# Patient Record
Sex: FEMALE | Race: WHITE | NOT HISPANIC OR LATINO | Employment: STUDENT | RURAL
[De-identification: names, ages, dates, MRNs, and addresses within clinical notes are randomized per-mention and may not be internally consistent; named-entity substitution may affect disease eponyms.]

---
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
## 2022-03-28 ENCOUNTER — TELEPHONE (OUTPATIENT)
Dept: OBSTETRICS AND GYNECOLOGY | Facility: CLINIC | Age: 22
End: 2022-03-28

## 2022-03-28 NOTE — TELEPHONE ENCOUNTER
PT is transferring from  and is currently 20 weeks. She has her anatomy scan with them on Wed 03/30. She stated she's been having a few things going on (Did not go into detail) but no one is giving her answers and she's just having a bad experience at . We scheduled her for new ob w/ Antonio on 04/27 but she wanted to know if she could come in sooner.

## 2022-03-28 NOTE — TELEPHONE ENCOUNTER
Pt. Plans on having her anatomy scan with  since it is already scheduled on Wednesday. She wanted to see VINAY earlier than 4/27 to discuss the pelvic pain/pressure she is having. Opening 4/13, rescheduled

## 2022-04-13 ENCOUNTER — TELEPHONE (OUTPATIENT)
Dept: OBSTETRICS AND GYNECOLOGY | Facility: CLINIC | Age: 22
End: 2022-04-13

## 2022-04-13 ENCOUNTER — INITIAL PRENATAL (OUTPATIENT)
Dept: OBSTETRICS AND GYNECOLOGY | Facility: CLINIC | Age: 22
End: 2022-04-13

## 2022-04-13 DIAGNOSIS — Z34.02 PRENATAL CARE, FIRST PREGNANCY IN SECOND TRIMESTER: Primary | ICD-10-CM

## 2022-04-13 DIAGNOSIS — F32.89 OTHER DEPRESSION: ICD-10-CM

## 2022-04-13 PROCEDURE — 99203 OFFICE O/P NEW LOW 30 MIN: CPT | Performed by: OBSTETRICS & GYNECOLOGY

## 2022-04-13 PROCEDURE — 99406 BEHAV CHNG SMOKING 3-10 MIN: CPT | Performed by: OBSTETRICS & GYNECOLOGY

## 2022-04-13 RX ORDER — SERTRALINE HYDROCHLORIDE 25 MG/1
50 TABLET, FILM COATED ORAL DAILY
COMMUNITY
Start: 2022-03-24 | End: 2022-04-13 | Stop reason: SDUPTHER

## 2022-04-13 RX ORDER — PRENATAL WITH FERROUS FUM AND FOLIC ACID 3080; 920; 120; 400; 22; 1.84; 3; 20; 10; 1; 12; 200; 27; 25; 2 [IU]/1; [IU]/1; MG/1; [IU]/1; MG/1; MG/1; MG/1; MG/1; MG/1; MG/1; UG/1; MG/1; MG/1; MG/1; MG/1
1 TABLET ORAL DAILY
COMMUNITY

## 2022-04-13 RX ORDER — ALBUTEROL SULFATE 90 UG/1
2 AEROSOL, METERED RESPIRATORY (INHALATION) EVERY 6 HOURS PRN
COMMUNITY
Start: 2022-01-19 | End: 2022-06-20 | Stop reason: SDUPTHER

## 2022-04-13 RX ORDER — SERTRALINE HYDROCHLORIDE 25 MG/1
50 TABLET, FILM COATED ORAL DAILY
Qty: 90 TABLET | Refills: 1 | Status: SHIPPED | OUTPATIENT
Start: 2022-04-13 | End: 2022-07-27

## 2022-04-13 NOTE — TELEPHONE ENCOUNTER
Reviewed patient's chart and confirmed [facility] records available through [facility] (labs and OB notes). Informed patient and provided patient with instruction on how to get to office. Verbalized understanding.

## 2022-04-13 NOTE — TELEPHONE ENCOUNTER
Pt sent transfer records through my chart to Dr. Alvarez and she wants to confirm Antonio got them.

## 2022-04-14 ENCOUNTER — TELEPHONE (OUTPATIENT)
Dept: OBSTETRICS AND GYNECOLOGY | Facility: CLINIC | Age: 22
End: 2022-04-14

## 2022-04-14 ENCOUNTER — ROUTINE PRENATAL (OUTPATIENT)
Dept: OBSTETRICS AND GYNECOLOGY | Facility: CLINIC | Age: 22
End: 2022-04-14

## 2022-04-14 VITALS — WEIGHT: 212.4 LBS | DIASTOLIC BLOOD PRESSURE: 74 MMHG | BODY MASS INDEX: 33.27 KG/M2 | SYSTOLIC BLOOD PRESSURE: 110 MMHG

## 2022-04-14 DIAGNOSIS — Z3A.23 23 WEEKS GESTATION OF PREGNANCY: Primary | ICD-10-CM

## 2022-04-14 DIAGNOSIS — O20.9 VAGINAL BLEEDING AFFECTING EARLY PREGNANCY: ICD-10-CM

## 2022-04-14 DIAGNOSIS — O26.859 SPOTTING IN PREGNANCY: Primary | ICD-10-CM

## 2022-04-14 LAB
BILIRUB BLD-MCNC: NEGATIVE MG/DL
CLARITY, POC: ABNORMAL
COLOR UR: YELLOW
GLUCOSE UR STRIP-MCNC: NEGATIVE MG/DL
KETONES UR QL: ABNORMAL
LEUKOCYTE EST, POC: ABNORMAL
NITRITE UR-MCNC: NEGATIVE MG/ML
PH UR: 1 [PH] (ref 5–8)
PROT UR STRIP-MCNC: NEGATIVE MG/DL
RBC # UR STRIP: ABNORMAL /UL
SP GR UR: 1.02 (ref 1–1.03)
UROBILINOGEN UR QL: NORMAL

## 2022-04-14 PROCEDURE — 99214 OFFICE O/P EST MOD 30 MIN: CPT | Performed by: NURSE PRACTITIONER

## 2022-04-14 RX ORDER — CEPHALEXIN 500 MG/1
500 CAPSULE ORAL 2 TIMES DAILY
Qty: 14 CAPSULE | Refills: 0 | Status: SHIPPED | OUTPATIENT
Start: 2022-04-14 | End: 2022-04-21

## 2022-04-14 NOTE — PROGRESS NOTES
OB FOLLOW UP  CC- Here for care of pregnancy        Jenny Brunson is a 21 y.o.  23w0d patient being seen today for her obstetrical follow up visit. Patient reports occasional cramping, headaches, and vaginal spotting. Patient stated that she started spotting this morning around 9 am. Patient stated that the spotting is bright red with blood clots. Patient stated that the blood clots are tiny and the spotting is light.     Her prenatal care is complicated by (and status) :    Patient Active Problem List   Diagnosis   • Prenatal care, first pregnancy in second trimester       Flu Status: Already given in current flu season  Ultrasound Today: Yes.  Findings showed anterior placenta, CL-47 mm.  I have personally evaluated the U/S and agree with the findings. FRANCISCO JAVIER Keyes    ROS -   Patient Reports : Vaginal Spotting, Headaches and Cramping  Patient Denies: Loss of Fluid, Vision Changes, Headaches, Nausea , Vomiting  and Contractions  Fetal Movement : normal  All other systems reviewed and are negative.       The additional following portions of the patient's history were reviewed and updated as appropriate: allergies and current medications.    I have reviewed and agree with the HPI, ROS, and historical information as entered above. Rosi Whaley, FRANCISCO JAVIER    /74   Wt 96.3 kg (212 lb 6.4 oz)   BMI 33.27 kg/m²       EXAM:     FHT: pos BPM     Pelvic Exam: Yes Dilation: Closed  Effacement: Long    Urine glucose/protein: See prenatal flowsheet       Assessment and Plan    Problem List Items Addressed This Visit    None     Visit Diagnoses     23 weeks gestation of pregnancy    -  Primary    Relevant Orders    POC Urinalysis Dipstick    Urine Culture - Urine, Urine, Clean Catch    Vaginal bleeding affecting early pregnancy        Relevant Orders    Urine Culture - Urine, Urine, Clean Catch          1. Pregnancy at 23w0d  2. Fetal status reassuring.   3. Activity and Exercise discussed.  Return  for Next scheduled follow up.   CCUA- moderate leuks and blood, culture sent. Start keflex.   U/S normal today, VE closed/thick. No blood noted on exam  To L&D for further BRB or worsening cramping.   Advised to hydrate, pelvic rest, no heavy lifting.     Rosi Whaley, APRN  04/14/2022

## 2022-04-14 NOTE — TELEPHONE ENCOUNTER
Pt stated that she's having some some bright red spotting. She's been sore a few days and has some cramping. She stated the cramping is not severe but it feels like a normal cramp  
Spoke with pt and she states that she noticed she had some bright red spotting this morning and has been having some mild cramping. She reports she has an anterior placenta and is unable to feel baby move.     I spoke with TH and she states for pt to come in and have US for cervical length.   
Covid test

## 2022-04-18 ENCOUNTER — TELEPHONE (OUTPATIENT)
Dept: OBSTETRICS AND GYNECOLOGY | Facility: CLINIC | Age: 22
End: 2022-04-18

## 2022-04-18 LAB
BACTERIA UR CULT: ABNORMAL
BACTERIA UR CULT: ABNORMAL

## 2022-04-18 NOTE — TELEPHONE ENCOUNTER
S/w pt she states she thinks she lost her mucus plug yesterday morning around 10:30 AM. States it looked like thick snot and was yellow/green in color. I advised patient that if she thinks she lost her mucus plug then she will need to go into L&D since it is already past appts hours here in the office. Patient states she is unable to get a ride today and would like to come in tomorrow for an appt but needs to speak with  first to see if he can bring her. I advised patient to CB in the morning at 8:30AM to get an appt with our office. She v/u.

## 2022-04-18 NOTE — TELEPHONE ENCOUNTER
Dr. Alvarez OB pt.   23w4d    S/w pt she states she thinks she lost her mucus plug this morning and was concerned. States baby movement is present and denies contractions, vaginal bleeding, headaches, vision changes.    Pt. States she has a picture of the mucus plug she think she lost and is sending it through [employer] now w/ ATTN: Carine .

## 2022-04-20 ENCOUNTER — REFERRAL TRIAGE (OUTPATIENT)
Dept: LABOR AND DELIVERY | Facility: HOSPITAL | Age: 22
End: 2022-04-20

## 2022-04-21 ENCOUNTER — PATIENT OUTREACH (OUTPATIENT)
Dept: LABOR AND DELIVERY | Facility: HOSPITAL | Age: 22
End: 2022-04-21

## 2022-04-21 NOTE — OUTREACH NOTE
[facility]  Enrollment    Questions/Answers    Flowsheet Row Responses   Would like to participate? Yes   Date of Intake Visit 04/25/22              Becki Keys RN  Maternity Nurse Navigator    4/21/2022, 16:49 EDT

## 2022-04-25 ENCOUNTER — PATIENT OUTREACH (OUTPATIENT)
Dept: LABOR AND DELIVERY | Facility: HOSPITAL | Age: 22
End: 2022-04-25

## 2022-04-29 ENCOUNTER — TELEPHONE (OUTPATIENT)
Dept: OBSTETRICS AND GYNECOLOGY | Facility: CLINIC | Age: 22
End: 2022-04-29

## 2022-05-05 VITALS — WEIGHT: 215 LBS | DIASTOLIC BLOOD PRESSURE: 70 MMHG | BODY MASS INDEX: 33.67 KG/M2 | SYSTOLIC BLOOD PRESSURE: 100 MMHG

## 2022-05-16 ENCOUNTER — TELEPHONE (OUTPATIENT)
Dept: OBSTETRICS AND GYNECOLOGY | Facility: CLINIC | Age: 22
End: 2022-05-16

## 2022-05-16 NOTE — TELEPHONE ENCOUNTER
Dr. Alvarez OB pt.   27w4d      S/w pt she states FOB recently was taken to senior living and patient is in the process of moving out of the house and trying to find somewhere to go that she can afford. States her current landlord is helping her  find somewhere to go for cheaper rent. Patient states she is needing a note to be excused from work due to trying to figure out living situation as of now and get all of this taken care of. Patients current job denied FMLA/leave because she has not been there for one year yet. I told patient I would discuss this with Dr. Alvarez and let her know. She v/u

## 2022-05-16 NOTE — TELEPHONE ENCOUNTER
Pt called to speak to someone regarding needing a note for time off of work. Pt has had some personal events occur which has led her to need time off to move living situations with no help. Please call pt back and advise.

## 2022-05-16 NOTE — TELEPHONE ENCOUNTER
While I sympathize, I'm not sure a note from me is going to do anything for her situation.  This is really between her and her employer.

## 2022-06-06 ENCOUNTER — ROUTINE PRENATAL (OUTPATIENT)
Dept: OBSTETRICS AND GYNECOLOGY | Facility: CLINIC | Age: 22
End: 2022-06-06

## 2022-06-06 VITALS — SYSTOLIC BLOOD PRESSURE: 102 MMHG | WEIGHT: 216.2 LBS | BODY MASS INDEX: 33.86 KG/M2 | DIASTOLIC BLOOD PRESSURE: 60 MMHG

## 2022-06-06 DIAGNOSIS — Z3A.30 30 WEEKS GESTATION OF PREGNANCY: Primary | ICD-10-CM

## 2022-06-06 LAB
GLUCOSE UR STRIP-MCNC: NEGATIVE MG/DL
PROT UR STRIP-MCNC: NEGATIVE MG/DL

## 2022-06-06 PROCEDURE — 99213 OFFICE O/P EST LOW 20 MIN: CPT | Performed by: OBSTETRICS & GYNECOLOGY

## 2022-06-06 NOTE — PROGRESS NOTES
OB FOLLOW UP    Jenny Brunson is a 21 y.o.  30w4d patient being seen today for her obstetrical follow up visit. Patient reports no complaints.     Her prenatal care is complicated by (and status) :    Patient Active Problem List   Diagnosis   • Prenatal care, first pregnancy in second trimester   • 30 weeks gestation of pregnancy   • Decreased fetal movement   • 32 weeks gestation of pregnancy       ROS -   Patient Reports : No Problems  Patient Denies: No Problems  Fetal Movement : normal      /60   Wt 98.1 kg (216 lb 3.2 oz)   BMI 33.86 kg/m²     Ultrasound Today: no    EXAM:  Vitals: See prenatal flowsheet   Abdomen: See prenatal flowsheet   Urine glucose/protein: See prenatal flowsheet   Pelvic: See prenatal flowsheet     Assessment    Diagnoses and all orders for this visit:    1. 30 weeks gestation of pregnancy (Primary)  -     CBC (No Diff)  -     Antibody Screen  -     Gestational Screen 1 Hr ([facility])  -     POC Urinalysis Dipstick        1. Pregnancy at 30w4d  2. Fetal status reassuring     Problem List Items Addressed This Visit     30 weeks gestation of pregnancy - Primary    Relevant Orders    CBC (No Diff) (Completed)    Antibody Screen (Completed)    Gestational Screen 1 Hr ([facility]) (Completed)    POC Urinalysis Dipstick (Completed)          Plan    1. Fetal movement/ Labor Precautions  2. Comfort measures.  Diet/exercise precautions  Follow Up: Return in about 2 weeks (around 2022) for Next scheduled follow up.        Itzel Alvarez MD  2022

## 2022-06-07 LAB
BLD GP AB SCN SERPL QL: NEGATIVE
ERYTHROCYTE [DISTWIDTH] IN BLOOD BY AUTOMATED COUNT: 13.2 % (ref 12.3–15.4)
GLUCOSE 1H P 50 G GLC PO SERPL-MCNC: 91 MG/DL (ref 65–139)
HCT VFR BLD AUTO: 31.9 % (ref 34–46.6)
HGB BLD-MCNC: 10.8 G/DL (ref 12–15.9)
MCH RBC QN AUTO: 27.2 PG (ref 26.6–33)
MCHC RBC AUTO-ENTMCNC: 33.9 G/DL (ref 31.5–35.7)
MCV RBC AUTO: 80.4 FL (ref 79–97)
PLATELET # BLD AUTO: 195 10*3/MM3 (ref 140–450)
RBC # BLD AUTO: 3.97 10*6/MM3 (ref 3.77–5.28)
WBC # BLD AUTO: 8.64 10*3/MM3 (ref 3.4–10.8)

## 2022-06-14 ENCOUNTER — HOSPITAL ENCOUNTER (OUTPATIENT)
Facility: HOSPITAL | Age: 22
Setting detail: OBSERVATION
Discharge: HOME | End: 2022-06-14
Attending: OBSTETRICS & GYNECOLOGY | Admitting: OBSTETRICS & GYNECOLOGY

## 2022-06-14 ENCOUNTER — TELEPHONE (OUTPATIENT)
Dept: OBSTETRICS AND GYNECOLOGY | Facility: CLINIC | Age: 22
End: 2022-06-14

## 2022-06-14 VITALS
RESPIRATION RATE: 18 BRPM | TEMPERATURE: 99.1 F | HEART RATE: 100 BPM | HEIGHT: 69 IN | DIASTOLIC BLOOD PRESSURE: 62 MMHG | WEIGHT: 215 LBS | SYSTOLIC BLOOD PRESSURE: 123 MMHG | BODY MASS INDEX: 31.84 KG/M2

## 2022-06-14 PROBLEM — O36.8190 DECREASED FETAL MOVEMENT: Status: ACTIVE | Noted: 2022-06-14

## 2022-06-14 PROCEDURE — 59025 FETAL NON-STRESS TEST: CPT

## 2022-06-14 PROCEDURE — 99218 PR INITIAL OBSERVATION CARE/DAY 30 MINUTES: CPT | Performed by: OBSTETRICS & GYNECOLOGY

## 2022-06-14 PROCEDURE — G0378 HOSPITAL OBSERVATION PER HR: HCPCS

## 2022-06-14 PROCEDURE — 59025 FETAL NON-STRESS TEST: CPT | Performed by: OBSTETRICS & GYNECOLOGY

## 2022-06-14 NOTE — TELEPHONE ENCOUNTER
Dr. Alvarez OB pt.   31w5d    S/w pt she states for the past 2 days she has had increased fetal movement and baby has been moving extremely fast and she also has constipation and her stomach feels very tight (at times)     Patient denies vaginal bleeding, LOF, HA's or vision changes, severe pelvic pain.    Per Libby, patient needs to go downstairs to L&D for further evaluation.     S/w pt she v/u

## 2022-06-14 NOTE — H&P
[facility]  Obstetric History and Physical    Referring Provider: Itzel Alvarez*      Chief Complaint   Patient presents with   • Decreased Fetal Movement     Noted today w/increased fatigue       Subjective     Patient is a 21 y.o. female  currently at 31w5d, who presents with complaint decreased fetal movement.  Patient reports decreased fetal movement today without associated leaking of fluid, vaginal bleeding, recent trauma, short of breath, chest pain any other concerns.  After arrival to labor and delivery patient reports more normal fetal activity.  Prenatal care has been uneventful to date..    .    The following portions of the patients history were reviewed and updated as appropriate: current medications, allergies, past medical history, past surgical history, past family history, past social history and problem list .       Prenatal Information:   Maternal Prenatal Labs  Blood Type No results found for: ABO   Rh Status No results found for: RH   Antibody Screen No results found for: ABSCRN   Gonnorhea No results found for: GCCX   Chlamydia No results found for: CLAMYDCU   RPR No results found for: RPR   Syphilis Antibody No results found for: SYPHILIS   Rubella No results found for: RUBELLAIGGIN   Hepatitis B Surface Antigen No results found for: HEPBSAG   HIV-1 Antibody No results found for: LABHIV1   Hepatitis C Antibody No results found for: HEPCAB   Rapid Urin Drug Screen No results found for: AMPMETHU, BARBITSCNUR, LABBENZSCN, LABMETHSCN, LABOPIASCN, THCURSCR, COCAINEUR, AMPHETSCREEN, PROPOXSCN, BUPRENORSCNU, METAMPSCNUR, OXYCODONESCN, TRICYCLICSCN   Group B Strep Culture No results found for: GBSANTIGEN           External Prenatal Results     Pregnancy Outside Results - Transcribed From Office Records - See Scanned Records For Details     Test Value Date Time    ABO       Rh       Antibody Screen  Negative  22 1007    Varicella IgG       Rubella       Hgb  10.8 g/dL 22  1007      ^ 13.3 g/dL 224      ^ 13.5 g/dL 01/10/22 1619    Hct  31.9 % 22 1007      ^ 39.7 % 22      ^ 40.0 % 01/10/22 1619    Glucose Fasting GTT       Glucose Tolerance Test 1 hour       Glucose Tolerance Test 3 hour       Gonorrhea (discrete)       Chlamydia (discrete)       RPR       VDRL       Syphilis Antibody       HBsAg ^ Negative  01/10/22 161    Herpes Simplex Virus PCR       Herpes Simplex VIrus Culture       HIV ^ Nonreactive  01/10/22 1619    Hep C RNA Quant PCR       Hep C Antibody       AFP       Group B Strep       GBS Susceptibility to Clindamycin       GBS Susceptibility to Erythromycin       Fetal Fibronectin       Genetic Testing, Maternal Blood             Drug Screening     Test Value Date Time    Urine Drug Screen       Amphetamine Screen  Negative  16    Barbiturate Screen       Benzodiazepine Screen  Negative  16    Methadone Screen       Phencyclidine Screen       Opiates Screen       THC Screen  PRESUMPTIVE POSITIVE  16    Cocaine Screen       Propoxyphene Screen       Buprenorphine Screen ^ <10 ng/mL 01/10/22 1500      ^ <50 ng/mL 01/10/22 1500    Methamphetamine Screen       Oxycodone Screen       Tricyclic Antidepressants Screen  Negative  16          Legend    ^: Historical                          Past OB History:       OB History    Para Term  AB Living   1 0 0 0 0 0   SAB IAB Ectopic Molar Multiple Live Births   0 0 0 0 0 0      # Outcome Date GA Lbr Jay/2nd Weight Sex Delivery Anes PTL Lv   1 Current                Past Medical History: Past Medical History:   Diagnosis Date   • Anxiety    • Asthma    • Depression    • Tonsil, abscess       Past Surgical History Past Surgical History:   Procedure Laterality Date   • TONSILLECTOMY  2017    due to abscess      Family History: Family History   Problem Relation Age of Onset   • Skin cancer Mother    • Autism Brother    • Bipolar disorder Brother    •  Psychosis Brother       Social History:  reports that she has never smoked. She uses smokeless tobacco.   reports previous alcohol use.   reports no history of drug use.                   General ROS Negative Findings:Headaches, Visual Changes, Epigastric pain, Anorexia, Nausia/Vomiting, ROM and Vaginal Bleeding    ROS     All other systems have been reviewed and are neg  Objective       Vital Signs Range for the last 24 hours  Temperature: Temp:  [99.1 °F (37.3 °C)] 99.1 °F (37.3 °C)   Temp Source: Temp src: Oral   BP: BP: (123)/(62) 123/62   Pulse: Heart Rate:  [100] 100   Respirations: Resp:  [18] 18   SPO2:     O2 Amount (l/min):     O2 Devices     Weight: Weight:  [97.5 kg (215 lb)] 97.5 kg (215 lb)     Physical Examination:   General:   alert, appears stated age and cooperative   Skin:   normal   HEENT:  Sclera clear   Lungs:      Heart:      Gastrointestinal:  Abdomen soft, gravid uterus nontender, no guarding benign exam   Lower Extremities:  Trace edema, no calf tenderness   : External genitalia: normal general appearance  Uterus: enlarged   Musculoskeletal:     Neuro:  Focal deficits noted DTR 2+4 no clonus         Presentation: vtx   Cervix: Exam by: Method: sterile exam per physician (Feliz)   Dilation:  Closed   Effacement:  30%   Station:  -3 not engaged  No blood noted on glove.       Fetal Heart Rate Assessment   Method:     Beats/min:     Baseline:     Varibility:     Accels:     Decels:     Tracing Category:     NST-indications decreased fetal movement, interpretation reactive, moderate variability, accelerations present 15 x 15, no fetal celebrations noted, onset 1712, end time 1808, no regular contractions noted.  Uterine Assessment   Method:     Frequency (min):     Ctx Count in 10 min:     Duration:     Intensity:     Intensity by IUPC:     Resting Tone:     Resting Tone by IUPC:     Jal Units:       Laboratory Results:   Lab Results (last 24 hours)     ** No results found for the  last 24 hours. **        Radiology Review:   Imaging Results (Last 24 Hours)     ** No results found for the last 24 hours. **        Other Studies: Bedside portable ultrasound: Single IUP vertex presentation, fetus active, biophysical profile 8 out of 8, anterior placenta.    Assessment & Plan       Decreased fetal movement        Assessment:  1.  Intrauterine pregnancy at 31w5d weeks gestation with reactive fetal status.    2.  History decreased fetal movement, currently reports normal fetal activity-fetal wellbeing established.  3.  False labor  4.      Plan:  1.  Discharge home, kick count,  instructions given, follow-up with OB provider routine or as needed  2. Plan of care has been reviewed with patient.  3.  Risks, benefits of treatment plan have been discussed.  4.  All questions have been answered.  5      Osvaldo Piper,   2022  18:23 EDT

## 2022-06-20 ENCOUNTER — ROUTINE PRENATAL (OUTPATIENT)
Dept: OBSTETRICS AND GYNECOLOGY | Facility: CLINIC | Age: 22
End: 2022-06-20

## 2022-06-20 VITALS — SYSTOLIC BLOOD PRESSURE: 116 MMHG | DIASTOLIC BLOOD PRESSURE: 70 MMHG | WEIGHT: 218.2 LBS | BODY MASS INDEX: 32.22 KG/M2

## 2022-06-20 DIAGNOSIS — Z3A.32 32 WEEKS GESTATION OF PREGNANCY: Primary | ICD-10-CM

## 2022-06-20 LAB
EXPIRATION DATE: 0
GLUCOSE UR STRIP-MCNC: NEGATIVE MG/DL
Lab: 0
PROT UR STRIP-MCNC: NEGATIVE MG/DL

## 2022-06-20 PROCEDURE — 99213 OFFICE O/P EST LOW 20 MIN: CPT | Performed by: OBSTETRICS & GYNECOLOGY

## 2022-06-20 RX ORDER — SERTRALINE HYDROCHLORIDE 25 MG/1
25 TABLET, FILM COATED ORAL DAILY
COMMUNITY
Start: 2022-03-24 | End: 2022-06-20 | Stop reason: SDUPTHER

## 2022-06-26 PROBLEM — F32.A DEPRESSION: Status: ACTIVE | Noted: 2022-06-26

## 2022-06-28 ENCOUNTER — TELEPHONE (OUTPATIENT)
Dept: OBSTETRICS AND GYNECOLOGY | Facility: CLINIC | Age: 22
End: 2022-06-28

## 2022-06-28 NOTE — TELEPHONE ENCOUNTER
Call made to patient. Pt states possible irregular contractions, not lasting long, not intensifying with each contraction. Right sided achy/stabbing pain. Pressure with sitting and standing. Back pain, Exhausted. Has not tried tylenol bc she does not want to tk medication during pregnancy. Cannot find a belly band anywhere local.  Baby is moving good like nml, no vaginal bleeding, no ROM. Drinking plenty of water. Tried lying on L side. Not comfortable. Went to L&D a few weeks ago. She was told everything was good. Patient advised she can try to drink 1-2 quarts of water lie on left side, get a belly band from [employer], try tylenol bc baby is growing and as pregnancy progresses she will be uncomfortable. If she feels pain and pressure are severe she can come in today to be evaluated in L&D. Patient states she will check on [employer] for belly band. lie on left side, drink 1 quart of water, monitor for contractions, and baby's movement. Call back if symptoms worsen, contractions occur >4/hr lasting 45-60sec, movements decrease/increase, experiences ROM or vag bleeding call back immediately.

## 2022-06-28 NOTE — TELEPHONE ENCOUNTER
PT STATED FOR THE LAST SEVERAL DAYS SHE HAS BEEN EXPERIENCING EXTREME DISCOMFORT AND DOWNWARD PELVIC PRESSURE WHEN SITTING OR STANDING PT STATED SHE IS HIGHLY UNCOMFORTABLE AND HAVING LOTS OF BACK PAIN AND STOMACH CRAMPING. PT STATED SHE IS VERY FATIGUED STATED SHE RARELY EVER TAKES NAPS AND HAS ALREADY TAKEN 3 TODAY. PT STATED SYMPTOMS WORSEN WITH MOVEMENT OR ACTIVITY

## 2022-06-28 NOTE — TELEPHONE ENCOUNTER
Spoke with pt and she states that in the last couple of days she has begin having some back pain, extreme fatigue, period like cramping (not close together), she has pain when sitting/standing (feels like baby drops every time she stands) and she is having tightening in her abdomen intermittently. She states she is feeling baby move good and putting her legs up helps.

## 2022-06-30 ENCOUNTER — HOSPITAL ENCOUNTER (OUTPATIENT)
Facility: HOSPITAL | Age: 22
Discharge: HOME | End: 2022-06-30
Attending: OBSTETRICS & GYNECOLOGY | Admitting: OBSTETRICS & GYNECOLOGY

## 2022-06-30 VITALS
WEIGHT: 218 LBS | DIASTOLIC BLOOD PRESSURE: 77 MMHG | HEART RATE: 81 BPM | SYSTOLIC BLOOD PRESSURE: 119 MMHG | BODY MASS INDEX: 32.19 KG/M2 | RESPIRATION RATE: 16 BRPM | TEMPERATURE: 98.8 F

## 2022-06-30 PROCEDURE — G0463 HOSPITAL OUTPT CLINIC VISIT: HCPCS

## 2022-06-30 PROCEDURE — 59025 FETAL NON-STRESS TEST: CPT | Performed by: OBSTETRICS & GYNECOLOGY

## 2022-06-30 PROCEDURE — 99213 OFFICE O/P EST LOW 20 MIN: CPT | Performed by: OBSTETRICS & GYNECOLOGY

## 2022-06-30 PROCEDURE — 59025 FETAL NON-STRESS TEST: CPT

## 2022-07-06 ENCOUNTER — ROUTINE PRENATAL (OUTPATIENT)
Dept: OBSTETRICS AND GYNECOLOGY | Facility: CLINIC | Age: 22
End: 2022-07-06

## 2022-07-06 VITALS — SYSTOLIC BLOOD PRESSURE: 130 MMHG | DIASTOLIC BLOOD PRESSURE: 80 MMHG | BODY MASS INDEX: 32.78 KG/M2 | WEIGHT: 222 LBS

## 2022-07-06 DIAGNOSIS — Z3A.34 34 WEEKS GESTATION OF PREGNANCY: Primary | ICD-10-CM

## 2022-07-06 LAB
EXPIRATION DATE: NORMAL
GLUCOSE UR STRIP-MCNC: NEGATIVE MG/DL
Lab: NORMAL
PROT UR STRIP-MCNC: NEGATIVE MG/DL

## 2022-07-06 PROCEDURE — 99213 OFFICE O/P EST LOW 20 MIN: CPT | Performed by: NURSE PRACTITIONER

## 2022-07-06 RX ORDER — FERROUS SULFATE 325(65) MG
325 TABLET ORAL
Qty: 60 TABLET | Refills: 1 | Status: SHIPPED | OUTPATIENT
Start: 2022-07-06

## 2022-07-06 NOTE — PROGRESS NOTES
OB FOLLOW UP  CC- Here for care of pregnancy        Jenny Brunson is a 22 y.o.  34w6d patient being seen today for her obstetrical follow up visit. Patient reports positive home covid test on 2022.  She reports that she has still been feeling weak and sluggish.       She c/o swelling in her feet and ankles.  She also reports ade davey, she states that discomfort is occurring most of the day.      She denies LOF, vaginal spotting, headaches, vision changes. She reports adequate fetal movements, >10 movements in 10 hours.     Her prenatal care is complicated by (and status) :    Patient Active Problem List   Diagnosis   • Prenatal care, first pregnancy in second trimester   • 30 weeks gestation of pregnancy   • Decreased fetal movement   • 32 weeks gestation of pregnancy   • Depression     Ultrasound Today: No.    ROS -   Patient Reports : swelling and ade davey  Patient Denies: Loss of Fluid, Vaginal Spotting, Vision Changes, Headaches, Nausea , Vomiting  and Epigastric pain  Fetal Movement : adequate  All other systems reviewed and are negative.       The additional following portions of the patient's history were reviewed and updated as appropriate: allergies, current medications, past family history, past medical history, past social history, past surgical history and problem list.    I have reviewed and agree with the HPI, ROS, and historical information as entered above. Cherry Resendez, APRN    /80   Wt 101 kg (222 lb)   BMI 32.78 kg/m²       EXAM:     FHT: 137BPM   Uterine Size: size equals dates  Pelvic Exam: No    Urine glucose/protein: See prenatal flowsheet       Assessment and Plan    Problem List Items Addressed This Visit    None     Visit Diagnoses     34 weeks gestation of pregnancy    -  Primary    Relevant Medications    ferrous sulfate 325 (65 FE) MG tablet    Other Relevant Orders    POC Glucose, Urine, Qualitative, Dipstick (Completed)    POC Protein, Urine,  Qualitative, Dipstick (Completed)          1. Pregnancy at 34w6d  2. Fetal status reassuring.   3. Activity and Exercise discussed.  + COVID test on 7/1/22, will start Baby ASA qd, Zinc 50mg daily, Vitamin C 500mg daily, Vit D 1000 IU daily  Will start Fe bid as recommended several weeks ago   Monitor daily fetal movements  RTC in one week with NST and see .     Cherry Resendez, APRN  07/06/2022

## 2022-07-13 ENCOUNTER — ROUTINE PRENATAL (OUTPATIENT)
Dept: OBSTETRICS AND GYNECOLOGY | Facility: CLINIC | Age: 22
End: 2022-07-13

## 2022-07-13 VITALS — SYSTOLIC BLOOD PRESSURE: 124 MMHG | DIASTOLIC BLOOD PRESSURE: 80 MMHG | BODY MASS INDEX: 32.64 KG/M2 | WEIGHT: 221 LBS

## 2022-07-13 DIAGNOSIS — Z3A.35 35 WEEKS GESTATION OF PREGNANCY: ICD-10-CM

## 2022-07-13 DIAGNOSIS — Z34.90 PREGNANCY, UNSPECIFIED GESTATIONAL AGE: Primary | ICD-10-CM

## 2022-07-13 PROBLEM — Z3A.32 32 WEEKS GESTATION OF PREGNANCY: Status: RESOLVED | Noted: 2022-06-20 | Resolved: 2022-07-13

## 2022-07-13 PROBLEM — Z3A.30 30 WEEKS GESTATION OF PREGNANCY: Status: RESOLVED | Noted: 2022-06-06 | Resolved: 2022-07-13

## 2022-07-13 LAB
GLUCOSE UR STRIP-MCNC: NEGATIVE MG/DL
PROT UR STRIP-MCNC: NEGATIVE MG/DL

## 2022-07-13 PROCEDURE — 87081 CULTURE SCREEN ONLY: CPT | Performed by: OBSTETRICS & GYNECOLOGY

## 2022-07-13 PROCEDURE — 99213 OFFICE O/P EST LOW 20 MIN: CPT | Performed by: OBSTETRICS & GYNECOLOGY

## 2022-07-13 NOTE — PROGRESS NOTES
OB FOLLOW UP  CC- Here for care of pregnancy        Jenny Brunson is a 22 y.o.  35w6d patient being seen today for her obstetrical follow up visit. Patient reports cramps in thighs. Good fm, occ menstrual type cramping, no vb or lof, +pelvic pressure,     Her prenatal care is complicated by (and status) : None  Patient Active Problem List   Diagnosis   • Prenatal care, first pregnancy in second trimester   • Decreased fetal movement   • Depression   • 35 weeks gestation of pregnancy   • BMI 32.0-32.9,adult       GBS Status: Done Today. She is not allergic to PCN.    No Known Allergies       Her Delivery Plan is: Desires IOL about 40wks. Needs to be scheduled 8/9pm  US today: no    ROS -   Patient Reports : No Problems  Patient Denies: Loss of Fluid, Vaginal Spotting, Contractions and notes pelvic pressure  Fetal Movement : normal  All other systems reviewed and are negative.       The additional following portions of the patient's history were reviewed and updated as appropriate: allergies, current medications, past family history, past medical history, past social history, past surgical history and problem list.    I have reviewed and agree with the HPI, ROS, and historical information as entered above. Itzel Alvarez MD      EXAM:     Prenatal Vitals  BP: 124/80  Weight: 100 kg (221 lb)   Fetal Heart Rate: pos   Fundal Height (cm): 36 cm   Dilation/Effacement/Station  Dilation: Closed  Effacement (%): 50  Station: -2      Urine Glucose Read-only: Negative  Urine Protein Read-only: Negative       Assessment and Plan    Problem List Items Addressed This Visit     35 weeks gestation of pregnancy    BMI 32.0-32.9,adult      Other Visit Diagnoses     Pregnancy, unspecified gestational age    -  Primary    Relevant Orders    POC Urinalysis Dipstick (Completed)    Group B Streptococcus Culture - Swab, Vaginal/Rectum          1. Pregnancy at 35w6d  2. Fetal status reassuring.   3. Reviewed  Pre-eclampsia signs/symptoms  4. Discussed IOL options with patient. Pt. desires IOL at 39 weeks.   5. Delivery options reviewed with patient  6. Signs of labor reviewed  7. Kick counts reviewed  8. Activity and Exercise discussed.  Return in about 1 week (around 7/20/2022) for Next scheduled follow up.    Itzel Alvarez MD  07/13/2022

## 2022-07-17 LAB — BACTERIA SPEC AEROBE CULT: NORMAL

## 2022-07-20 ENCOUNTER — ROUTINE PRENATAL (OUTPATIENT)
Dept: OBSTETRICS AND GYNECOLOGY | Facility: CLINIC | Age: 22
End: 2022-07-20

## 2022-07-20 VITALS — SYSTOLIC BLOOD PRESSURE: 120 MMHG | WEIGHT: 223 LBS | DIASTOLIC BLOOD PRESSURE: 62 MMHG | BODY MASS INDEX: 32.93 KG/M2

## 2022-07-20 DIAGNOSIS — Z3A.37 37 WEEKS GESTATION OF PREGNANCY: Primary | ICD-10-CM

## 2022-07-20 LAB
GLUCOSE UR STRIP-MCNC: NEGATIVE MG/DL
PROT UR STRIP-MCNC: NEGATIVE MG/DL

## 2022-07-20 PROCEDURE — 99213 OFFICE O/P EST LOW 20 MIN: CPT | Performed by: OBSTETRICS & GYNECOLOGY

## 2022-07-22 DIAGNOSIS — Z3A.37 37 WEEKS GESTATION OF PREGNANCY: Primary | ICD-10-CM

## 2022-07-27 ENCOUNTER — ROUTINE PRENATAL (OUTPATIENT)
Dept: OBSTETRICS AND GYNECOLOGY | Facility: CLINIC | Age: 22
End: 2022-07-27

## 2022-07-27 VITALS — DIASTOLIC BLOOD PRESSURE: 82 MMHG | SYSTOLIC BLOOD PRESSURE: 110 MMHG | WEIGHT: 224.3 LBS | BODY MASS INDEX: 33.12 KG/M2

## 2022-07-27 DIAGNOSIS — Z3A.37 37 WEEKS GESTATION OF PREGNANCY: Primary | ICD-10-CM

## 2022-07-27 LAB
CLARITY, POC: CLEAR
COLOR UR: YELLOW
GLUCOSE UR STRIP-MCNC: NEGATIVE MG/DL
PROT UR STRIP-MCNC: NEGATIVE MG/DL

## 2022-07-27 PROCEDURE — 99213 OFFICE O/P EST LOW 20 MIN: CPT | Performed by: NURSE PRACTITIONER

## 2022-07-27 NOTE — PROGRESS NOTES
OB FOLLOW UP  CC- Here for care of pregnancy        Jenny Brunson is a 22 y.o.  37w6d patient being seen today for her obstetrical follow up visit. Patient reports occasional contractions, nausea, cramping, and tightness. Patient stated that she is also having back pain. Patient stated that she has experienced bright green diarrhea. Patient stated that she has been having the diarrhea the entire pregnancy but yesterday it turned green. She has a history of IBS. Denies abdominal pain or fever.     Her prenatal care is complicated by (and status) :    Patient Active Problem List   Diagnosis   • Prenatal care, first pregnancy in second trimester   • Decreased fetal movement   • Depression   • 35 weeks gestation of pregnancy   • BMI 32.0-32.9,adult   • Pregnancy         Flu Status: Already given in current flu season  GBS Status: was already done and is negative.  Her Delivery Plan is: Desires IOL at 39wks. Scheduled  Ultrasound Today: No.    ROS -   Patient Reports : Cramping, Nausea, Contractions and Tightness  Patient Denies: Loss of Fluid, Vaginal Spotting, Vision Changes, Headaches, Nausea  and Vomiting   Fetal Movement : normal  All other systems reviewed and are negative.       The additional following portions of the patient's history were reviewed and updated as appropriate: allergies and current medications.    I have reviewed and agree with the HPI, ROS, and historical information as entered above. Rosi Whaley, APRN        /82   Wt 102 kg (224 lb 4.8 oz)   BMI 33.12 kg/m²     EXAM:     FHT: pos BPM     Pelvic Exam: Yes Dilation: Closed    Urine glucose/protein: See prenatal flowsheet       Assessment and Plan    Problem List Items Addressed This Visit        Gravid and     Pregnancy - Primary    Overview     Desires IOL about 40wks. Needs to be scheduled 8/9pm         Relevant Orders    POC Urinalysis Dipstick (Completed)          1. Pregnancy at 37w6d  2. Fetal status  reassuring.   3. Activity and Exercise discussed.  Return in about 1 week (around 8/3/2022) for DUNCAN VINAY.   She attributes diarrhea to IBS, if persistent after delivery advised to let us know and will need follow up. She declined to do stool cultures today.     Rosi Whaley, APRN  07/27/2022

## 2022-07-30 ENCOUNTER — HOSPITAL ENCOUNTER (OUTPATIENT)
Facility: HOSPITAL | Age: 22
Setting detail: OBSERVATION
Discharge: HOME | End: 2022-07-31
Attending: OBSTETRICS & GYNECOLOGY | Admitting: OBSTETRICS & GYNECOLOGY

## 2022-07-30 PROCEDURE — 59025 FETAL NON-STRESS TEST: CPT

## 2022-07-30 PROCEDURE — G0378 HOSPITAL OBSERVATION PER HR: HCPCS

## 2022-07-31 VITALS
DIASTOLIC BLOOD PRESSURE: 73 MMHG | BODY MASS INDEX: 34.1 KG/M2 | TEMPERATURE: 98.8 F | HEIGHT: 68 IN | WEIGHT: 225 LBS | SYSTOLIC BLOOD PRESSURE: 109 MMHG | HEART RATE: 99 BPM | RESPIRATION RATE: 16 BRPM

## 2022-07-31 PROBLEM — O47.1 FALSE LABOR AFTER 37 WEEKS OF GESTATION WITHOUT DELIVERY: Status: ACTIVE | Noted: 2022-07-31

## 2022-07-31 PROCEDURE — 59025 FETAL NON-STRESS TEST: CPT

## 2022-07-31 PROCEDURE — G0378 HOSPITAL OBSERVATION PER HR: HCPCS

## 2022-07-31 PROCEDURE — 59025 FETAL NON-STRESS TEST: CPT | Performed by: OBSTETRICS & GYNECOLOGY

## 2022-07-31 PROCEDURE — 99219 PR INITIAL OBSERVATION CARE/DAY 50 MINUTES: CPT | Performed by: OBSTETRICS & GYNECOLOGY

## 2022-08-03 ENCOUNTER — ROUTINE PRENATAL (OUTPATIENT)
Dept: OBSTETRICS AND GYNECOLOGY | Facility: CLINIC | Age: 22
End: 2022-08-03

## 2022-08-03 VITALS — WEIGHT: 225.8 LBS | SYSTOLIC BLOOD PRESSURE: 118 MMHG | BODY MASS INDEX: 34.33 KG/M2 | DIASTOLIC BLOOD PRESSURE: 80 MMHG

## 2022-08-03 DIAGNOSIS — Z3A.38 38 WEEKS GESTATION OF PREGNANCY: Primary | ICD-10-CM

## 2022-08-03 PROCEDURE — 99212 OFFICE O/P EST SF 10 MIN: CPT

## 2022-08-03 NOTE — PROGRESS NOTES
OB FOLLOW UP  CC- Here for care of pregnancy        Jenny Brunson is a 22 y.o.  38w6d patient being seen today for her obstetrical follow up visit. Patient reports swelling in her feet/ankles.  She also reports irregular ade davey/contractions.    She denies LOF, vaginal spotting, headaches, vision changes.  She reports adequate fetal movements, >10 movements in 10 hours.     Her prenatal care is complicated by (and status) : None  Patient Active Problem List   Diagnosis   • Prenatal care, first pregnancy in second trimester   • Decreased fetal movement   • Depression   • 35 weeks gestation of pregnancy   • BMI 32.0-32.9,adult   • Pregnancy   • False labor after 37 weeks of gestation without delivery       GBS Status: negative  Group B Strep Culture   Date Value Ref Range Status   2022 No Group B Streptococcus isolated  Final         No Known Allergies     Her Delivery Plan is: IOL scheduled for 2022    History of COVID: Yes, date 2022  US today: no    ROS -   Patient Reports : swelling and ade davey/contractions  Patient Denies: Loss of Fluid, Vaginal Spotting, Vision Changes, Headaches, Nausea , Vomiting  and Epigastric pain  Fetal Movement : adequate  All other systems reviewed and are negative.     The additional following portions of the patient's history were reviewed and updated as appropriate: allergies, current medications, past family history, past medical history, past social history, past surgical history and problem list.    I have reviewed and agree with the HPI, ROS, and historical information as entered above. Joanna Damon, APRN      EXAM:     Prenatal Vitals  BP: 118/80  Weight: 102 kg (225 lb 12.8 oz)   Fetal Heart Rate: 145       Dilation/Effacement/Station  Dilation: 1  Effacement (%): 70    Urine Glucose Read-only: Negative  Urine Protein Read-only: Negative     Assessment and Plan  1. Covid + 22  2. Recent L&D visit  Problem List Items Addressed  This Visit        Gravid and     Pregnancy - Primary    Overview     Desires IOL about 40wks. Needs to be scheduled 8/9pm         Relevant Orders    POC Protein, Urine, Qualitative, Dipstick (Completed)    POC Glucose, Urine, Qualitative, Dipstick (Completed)          1. Pregnancy at 38w6d  2. Fetal status reassuring  3. Reviewed Pre-eclampsia signs/symptoms  4. Reviewed upcoming IOL with patient. Instructions given.  5. Delivery options reviewed with patient  6. Signs of labor reviewed  7. Kick counts reviewed  8. No Covid test indicated. CYDNEY Boswell notified  9. Activity and Exercise discussed.  Return for IOL scheduled 2022.    Joanna Damon, FRANCISCO JAVIER  2022

## 2022-08-08 ENCOUNTER — APPOINTMENT (OUTPATIENT)
Dept: PREADMISSION TESTING | Facility: HOSPITAL | Age: 22
End: 2022-08-08

## 2022-08-09 ENCOUNTER — HOSPITAL ENCOUNTER (OUTPATIENT)
Dept: LABOR AND DELIVERY | Facility: HOSPITAL | Age: 22
Discharge: HOME | End: 2022-08-09

## 2022-08-09 ENCOUNTER — HOSPITAL ENCOUNTER (INPATIENT)
Facility: HOSPITAL | Age: 22
LOS: 2 days | Discharge: HOME | End: 2022-08-12
Attending: OBSTETRICS & GYNECOLOGY | Admitting: OBSTETRICS & GYNECOLOGY

## 2022-08-10 ENCOUNTER — ANESTHESIA (OUTPATIENT)
Dept: LABOR AND DELIVERY | Facility: HOSPITAL | Age: 22
End: 2022-08-10

## 2022-08-10 ENCOUNTER — ANESTHESIA EVENT (OUTPATIENT)
Dept: LABOR AND DELIVERY | Facility: HOSPITAL | Age: 22
End: 2022-08-10

## 2022-08-10 LAB
ABO GROUP BLD: NORMAL
ABO GROUP BLD: NORMAL
BASOPHILS # BLD AUTO: 0.02 10*3/MM3 (ref 0–0.2)
BASOPHILS NFR BLD AUTO: 0.2 % (ref 0–1.5)
BLD GP AB SCN SERPL QL: NEGATIVE
DEPRECATED RDW RBC AUTO: 43.2 FL (ref 37–54)
EOSINOPHIL # BLD AUTO: 0.04 10*3/MM3 (ref 0–0.4)
EOSINOPHIL NFR BLD AUTO: 0.3 % (ref 0.3–6.2)
ERYTHROCYTE [DISTWIDTH] IN BLOOD BY AUTOMATED COUNT: 15.2 % (ref 12.3–15.4)
HCT VFR BLD AUTO: 35.5 % (ref 34–46.6)
HGB BLD-MCNC: 12.1 G/DL (ref 12–15.9)
IMM GRANULOCYTES # BLD AUTO: 0.03 10*3/MM3 (ref 0–0.05)
IMM GRANULOCYTES NFR BLD AUTO: 0.2 % (ref 0–0.5)
LYMPHOCYTES # BLD AUTO: 2.71 10*3/MM3 (ref 0.7–3.1)
LYMPHOCYTES NFR BLD AUTO: 21.5 % (ref 19.6–45.3)
MCH RBC QN AUTO: 26.8 PG (ref 26.6–33)
MCHC RBC AUTO-ENTMCNC: 34.1 G/DL (ref 31.5–35.7)
MCV RBC AUTO: 78.7 FL (ref 79–97)
MONOCYTES # BLD AUTO: 0.94 10*3/MM3 (ref 0.1–0.9)
MONOCYTES NFR BLD AUTO: 7.5 % (ref 5–12)
NEUTROPHILS NFR BLD AUTO: 70.3 % (ref 42.7–76)
NEUTROPHILS NFR BLD AUTO: 8.85 10*3/MM3 (ref 1.7–7)
NRBC BLD AUTO-RTO: 0 /100 WBC (ref 0–0.2)
PLATELET # BLD AUTO: 220 10*3/MM3 (ref 140–450)
PMV BLD AUTO: 10.1 FL (ref 6–12)
RBC # BLD AUTO: 4.51 10*6/MM3 (ref 3.77–5.28)
RH BLD: POSITIVE
RH BLD: POSITIVE
T&S EXPIRATION DATE: NORMAL
WBC NRBC COR # BLD: 12.59 10*3/MM3 (ref 3.4–10.8)

## 2022-08-10 PROCEDURE — 85025 COMPLETE CBC W/AUTO DIFF WBC: CPT | Performed by: OBSTETRICS & GYNECOLOGY

## 2022-08-10 PROCEDURE — C1755 CATHETER, INTRASPINAL: HCPCS | Performed by: ANESTHESIOLOGY

## 2022-08-10 PROCEDURE — 86850 RBC ANTIBODY SCREEN: CPT | Performed by: OBSTETRICS & GYNECOLOGY

## 2022-08-10 PROCEDURE — 25010000002 ROPIVACAINE PER 1 MG

## 2022-08-10 PROCEDURE — 59025 FETAL NON-STRESS TEST: CPT

## 2022-08-10 PROCEDURE — 86901 BLOOD TYPING SEROLOGIC RH(D): CPT | Performed by: OBSTETRICS & GYNECOLOGY

## 2022-08-10 PROCEDURE — 86900 BLOOD TYPING SEROLOGIC ABO: CPT

## 2022-08-10 PROCEDURE — 25010000002 FENTANYL CITRATE (PF) 100 MCG/2ML SOLUTION

## 2022-08-10 PROCEDURE — C1755 CATHETER, INTRASPINAL: HCPCS

## 2022-08-10 PROCEDURE — 25010000002 BUTORPHANOL PER 1 MG: Performed by: OBSTETRICS & GYNECOLOGY

## 2022-08-10 PROCEDURE — 0KQM0ZZ REPAIR PERINEUM MUSCLE, OPEN APPROACH: ICD-10-PCS | Performed by: OBSTETRICS & GYNECOLOGY

## 2022-08-10 PROCEDURE — 51703 INSERT BLADDER CATH COMPLEX: CPT

## 2022-08-10 PROCEDURE — 25010000002 FENTANYL CITRATE (PF) 50 MCG/ML SOLUTION: Performed by: ANESTHESIOLOGY

## 2022-08-10 PROCEDURE — 59410 OBSTETRICAL CARE: CPT | Performed by: OBSTETRICS & GYNECOLOGY

## 2022-08-10 PROCEDURE — S0260 H&P FOR SURGERY: HCPCS | Performed by: OBSTETRICS & GYNECOLOGY

## 2022-08-10 PROCEDURE — 25010000002 ONDANSETRON PER 1 MG: Performed by: ANESTHESIOLOGY

## 2022-08-10 PROCEDURE — 86900 BLOOD TYPING SEROLOGIC ABO: CPT | Performed by: OBSTETRICS & GYNECOLOGY

## 2022-08-10 PROCEDURE — 86901 BLOOD TYPING SEROLOGIC RH(D): CPT

## 2022-08-10 RX ORDER — OXYTOCIN/0.9 % SODIUM CHLORIDE 30/500 ML
2-20 PLASTIC BAG, INJECTION (ML) INTRAVENOUS
Status: DISCONTINUED | OUTPATIENT
Start: 2022-08-10 | End: 2022-08-12 | Stop reason: HOSPADM

## 2022-08-10 RX ORDER — OXYTOCIN/0.9 % SODIUM CHLORIDE 30/500 ML
650 PLASTIC BAG, INJECTION (ML) INTRAVENOUS ONCE
Status: COMPLETED | OUTPATIENT
Start: 2022-08-11 | End: 2022-08-10

## 2022-08-10 RX ORDER — DIPHENHYDRAMINE HYDROCHLORIDE 50 MG/ML
12.5 INJECTION INTRAMUSCULAR; INTRAVENOUS EVERY 8 HOURS PRN
Status: DISCONTINUED | OUTPATIENT
Start: 2022-08-10 | End: 2022-08-11 | Stop reason: HOSPADM

## 2022-08-10 RX ORDER — OXYTOCIN/0.9 % SODIUM CHLORIDE 30/500 ML
85 PLASTIC BAG, INJECTION (ML) INTRAVENOUS ONCE
Status: COMPLETED | OUTPATIENT
Start: 2022-08-11 | End: 2022-08-10

## 2022-08-10 RX ORDER — ONDANSETRON 2 MG/ML
4 INJECTION INTRAMUSCULAR; INTRAVENOUS ONCE AS NEEDED
Status: COMPLETED | OUTPATIENT
Start: 2022-08-10 | End: 2022-08-10

## 2022-08-10 RX ORDER — MISOPROSTOL 100 MCG
25 TABLET ORAL ONCE
Status: COMPLETED | OUTPATIENT
Start: 2022-08-10 | End: 2022-08-10

## 2022-08-10 RX ORDER — SODIUM CHLORIDE 0.9 % (FLUSH) 0.9 %
10 SYRINGE (ML) INJECTION AS NEEDED
Status: DISCONTINUED | OUTPATIENT
Start: 2022-08-10 | End: 2022-08-12 | Stop reason: HOSPADM

## 2022-08-10 RX ORDER — EPHEDRINE SULFATE 5 MG/ML
INJECTION INTRAVENOUS
Status: DISCONTINUED
Start: 2022-08-10 | End: 2022-08-12 | Stop reason: HOSPADM

## 2022-08-10 RX ORDER — TRISODIUM CITRATE DIHYDRATE AND CITRIC ACID MONOHYDRATE 500; 334 MG/5ML; MG/5ML
30 SOLUTION ORAL ONCE
Status: DISCONTINUED | OUTPATIENT
Start: 2022-08-10 | End: 2022-08-11 | Stop reason: HOSPADM

## 2022-08-10 RX ORDER — SODIUM CHLORIDE 0.9 % (FLUSH) 0.9 %
10 SYRINGE (ML) INJECTION EVERY 12 HOURS SCHEDULED
Status: DISCONTINUED | OUTPATIENT
Start: 2022-08-10 | End: 2022-08-12 | Stop reason: HOSPADM

## 2022-08-10 RX ORDER — FAMOTIDINE 10 MG/ML
20 INJECTION, SOLUTION INTRAVENOUS ONCE AS NEEDED
Status: DISCONTINUED | OUTPATIENT
Start: 2022-08-10 | End: 2022-08-11 | Stop reason: HOSPADM

## 2022-08-10 RX ORDER — BUPIVACAINE HYDROCHLORIDE 2.5 MG/ML
INJECTION, SOLUTION EPIDURAL; INFILTRATION; INTRACAUDAL AS NEEDED
Status: DISCONTINUED | OUTPATIENT
Start: 2022-08-10 | End: 2022-08-10 | Stop reason: SURG

## 2022-08-10 RX ORDER — EPHEDRINE SULFATE 5 MG/ML
10 INJECTION INTRAVENOUS
Status: DISCONTINUED | OUTPATIENT
Start: 2022-08-10 | End: 2022-08-11 | Stop reason: HOSPADM

## 2022-08-10 RX ORDER — FENTANYL CITRATE 50 UG/ML
INJECTION, SOLUTION INTRAMUSCULAR; INTRAVENOUS AS NEEDED
Status: DISCONTINUED | OUTPATIENT
Start: 2022-08-10 | End: 2022-08-10 | Stop reason: SURG

## 2022-08-10 RX ORDER — SODIUM CHLORIDE, SODIUM LACTATE, POTASSIUM CHLORIDE, CALCIUM CHLORIDE 600; 310; 30; 20 MG/100ML; MG/100ML; MG/100ML; MG/100ML
125 INJECTION, SOLUTION INTRAVENOUS CONTINUOUS
Status: DISCONTINUED | OUTPATIENT
Start: 2022-08-10 | End: 2022-08-12 | Stop reason: HOSPADM

## 2022-08-10 RX ORDER — LIDOCAINE HYDROCHLORIDE AND EPINEPHRINE 15; 5 MG/ML; UG/ML
INJECTION, SOLUTION EPIDURAL AS NEEDED
Status: DISCONTINUED | OUTPATIENT
Start: 2022-08-10 | End: 2022-08-10 | Stop reason: SURG

## 2022-08-10 RX ORDER — METOCLOPRAMIDE HYDROCHLORIDE 5 MG/ML
10 INJECTION INTRAMUSCULAR; INTRAVENOUS ONCE AS NEEDED
Status: DISCONTINUED | OUTPATIENT
Start: 2022-08-10 | End: 2022-08-11 | Stop reason: HOSPADM

## 2022-08-10 RX ORDER — SODIUM CHLORIDE, SODIUM LACTATE, POTASSIUM CHLORIDE, CALCIUM CHLORIDE 600; 310; 30; 20 MG/100ML; MG/100ML; MG/100ML; MG/100ML
100 INJECTION, SOLUTION INTRAVENOUS CONTINUOUS
Status: DISCONTINUED | OUTPATIENT
Start: 2022-08-10 | End: 2022-08-12 | Stop reason: HOSPADM

## 2022-08-10 RX ADMIN — Medication 85 ML/HR: at 23:05

## 2022-08-10 RX ADMIN — SODIUM CHLORIDE, POTASSIUM CHLORIDE, SODIUM LACTATE AND CALCIUM CHLORIDE 300 ML: 600; 310; 30; 20 INJECTION, SOLUTION INTRAVENOUS at 18:56

## 2022-08-10 RX ADMIN — SODIUM CHLORIDE, POTASSIUM CHLORIDE, SODIUM LACTATE AND CALCIUM CHLORIDE 125 ML/HR: 600; 310; 30; 20 INJECTION, SOLUTION INTRAVENOUS at 14:47

## 2022-08-10 RX ADMIN — LIDOCAINE HYDROCHLORIDE AND EPINEPHRINE 3 ML: 15; 5 INJECTION, SOLUTION EPIDURAL at 17:01

## 2022-08-10 RX ADMIN — EPHEDRINE SULFATE 10 MG: 5 INJECTION INTRAVENOUS at 18:14

## 2022-08-10 RX ADMIN — BUTORPHANOL TARTRATE 2 MG: 2 INJECTION, SOLUTION INTRAMUSCULAR; INTRAVENOUS at 12:42

## 2022-08-10 RX ADMIN — BUPIVACAINE HYDROCHLORIDE 12 ML: 2.5 INJECTION, SOLUTION EPIDURAL; INFILTRATION; INTRACAUDAL; PERINEURAL at 17:03

## 2022-08-10 RX ADMIN — FENTANYL CITRATE 100 MCG: 50 INJECTION, SOLUTION INTRAMUSCULAR; INTRAVENOUS at 17:03

## 2022-08-10 RX ADMIN — ONDANSETRON 4 MG: 2 INJECTION INTRAMUSCULAR; INTRAVENOUS at 22:22

## 2022-08-10 RX ADMIN — Medication 650 ML/HR: at 22:35

## 2022-08-10 RX ADMIN — MISOPROSTOL 25 MCG: 100 TABLET ORAL at 09:26

## 2022-08-10 RX ADMIN — Medication 2 MILLI-UNITS/MIN: at 14:50

## 2022-08-10 RX ADMIN — SODIUM CHLORIDE, POTASSIUM CHLORIDE, SODIUM LACTATE AND CALCIUM CHLORIDE 125 ML/HR: 600; 310; 30; 20 INJECTION, SOLUTION INTRAVENOUS at 02:11

## 2022-08-10 RX ADMIN — SODIUM CHLORIDE, POTASSIUM CHLORIDE, SODIUM LACTATE AND CALCIUM CHLORIDE 125 ML/HR: 600; 310; 30; 20 INJECTION, SOLUTION INTRAVENOUS at 09:10

## 2022-08-10 RX ADMIN — Medication 2 MILLI-UNITS/MIN: at 02:44

## 2022-08-10 RX ADMIN — BUTORPHANOL TARTRATE 2 MG: 2 INJECTION, SOLUTION INTRAMUSCULAR; INTRAVENOUS at 15:57

## 2022-08-10 RX ADMIN — LIDOCAINE HYDROCHLORIDE AND EPINEPHRINE 2 ML: 15; 5 INJECTION, SOLUTION EPIDURAL at 17:02

## 2022-08-10 NOTE — ANESTHESIA PROCEDURE NOTES
Labor Epidural      Patient reassessed immediately prior to procedure    Patient location during procedure: OB  Performed By  Anesthesiologist: Bj Aj DO  Preanesthetic Checklist  Completed: patient identified, IV checked, site marked, risks and benefits discussed, surgical consent, monitors and equipment checked, pre-op evaluation and timeout performed  Additional Notes  Dural puncture performed with 5 inch 25 g Aaron needle, clear CSF.  Prep:  Pt Position:sitting  Sterile Tech:gloves, mask, sterile barrier and cap  Prep:chlorhexidine gluconate and isopropyl alcohol  Monitoring:blood pressure monitoring and continuous pulse oximetry  Epidural Block Procedure:  Approach:midline  Guidance:landmark technique and palpation technique  Location:L3-L4  Needle Type:Tuohy  Needle Gauge:17 G  Loss of Resistance Medium: air  Loss of Resistance: 5cm  Cath Depth at skin:10 cm  Paresthesia: none  Aspiration:negative  Test Dose:negative  Number of Attempts: 1  Post Assessment:  Dressing:secured with tape and occlusive dressing applied (Tegaderm Placed)  Pt Tolerance:patient tolerated the procedure well with no apparent complications  Complications:no

## 2022-08-10 NOTE — PROGRESS NOTES
Comfortable after epidural    CE -/-1  Ctx's q~3min  Pit @ 2    FHT's reviewed.  Was Cat 1  But after epidural, variables with contractions and min variability    - fluid bolus  - repositioning  - will continue to try and make tracing better with interventions.  - expect     Itzel Alvarez MD  08/10/22  18:04 EDT

## 2022-08-10 NOTE — ANESTHESIA PREPROCEDURE EVALUATION
Anesthesia Evaluation     Patient summary reviewed and Nursing notes reviewed   NPO Solid Status: > 6 hours  NPO Liquid Status: < 2 hours           Airway   Mallampati: II  TM distance: >3 FB  Neck ROM: full  No difficulty expected  Dental      Pulmonary    (+) asthma,  Cardiovascular - negative cardio ROS        Neuro/Psych  (+) psychiatric history Anxiety and Depression,    GI/Hepatic/Renal/Endo - negative ROS     Musculoskeletal (-) negative ROS    Abdominal    Substance History - negative use     OB/GYN    (+) Pregnant,         Other                        Anesthesia Plan    ASA 2     epidural       Anesthetic plan, risks, benefits, and alternatives have been provided, discussed and informed consent has been obtained with: patient.    Use of blood products discussed with patient .       CODE STATUS:

## 2022-08-11 ENCOUNTER — PATIENT OUTREACH (OUTPATIENT)
Dept: LABOR AND DELIVERY | Facility: HOSPITAL | Age: 22
End: 2022-08-11

## 2022-08-11 PROCEDURE — 0503F POSTPARTUM CARE VISIT: CPT | Performed by: NURSE PRACTITIONER

## 2022-08-11 RX ORDER — HYDROCORTISONE 25 MG/G
1 CREAM TOPICAL AS NEEDED
Status: DISCONTINUED | OUTPATIENT
Start: 2022-08-11 | End: 2022-08-12 | Stop reason: HOSPADM

## 2022-08-11 RX ORDER — BISACODYL 10 MG
10 SUPPOSITORY, RECTAL RECTAL DAILY PRN
Status: DISCONTINUED | OUTPATIENT
Start: 2022-08-12 | End: 2022-08-12 | Stop reason: HOSPADM

## 2022-08-11 RX ORDER — IBUPROFEN 600 MG/1
600 TABLET ORAL EVERY 6 HOURS SCHEDULED
Status: DISCONTINUED | OUTPATIENT
Start: 2022-08-11 | End: 2022-08-12 | Stop reason: HOSPADM

## 2022-08-11 RX ORDER — SODIUM CHLORIDE 0.9 % (FLUSH) 0.9 %
1-10 SYRINGE (ML) INJECTION AS NEEDED
Status: DISCONTINUED | OUTPATIENT
Start: 2022-08-11 | End: 2022-08-12 | Stop reason: HOSPADM

## 2022-08-11 RX ORDER — DOCUSATE SODIUM 100 MG/1
100 CAPSULE, LIQUID FILLED ORAL 2 TIMES DAILY
Status: DISCONTINUED | OUTPATIENT
Start: 2022-08-11 | End: 2022-08-12 | Stop reason: HOSPADM

## 2022-08-11 RX ORDER — MISOPROSTOL 200 UG/1
600 TABLET ORAL AS NEEDED
Status: DISCONTINUED | OUTPATIENT
Start: 2022-08-11 | End: 2022-08-12 | Stop reason: HOSPADM

## 2022-08-11 RX ADMIN — Medication: at 01:35

## 2022-08-11 RX ADMIN — IBUPROFEN 600 MG: 600 TABLET ORAL at 17:53

## 2022-08-11 RX ADMIN — IBUPROFEN 600 MG: 600 TABLET ORAL at 01:35

## 2022-08-11 RX ADMIN — IBUPROFEN 600 MG: 600 TABLET ORAL at 07:11

## 2022-08-11 RX ADMIN — DOCUSATE SODIUM 100 MG: 100 CAPSULE, LIQUID FILLED ORAL at 01:35

## 2022-08-11 RX ADMIN — IBUPROFEN 600 MG: 600 TABLET ORAL at 12:06

## 2022-08-11 RX ADMIN — DOCUSATE SODIUM 100 MG: 100 CAPSULE, LIQUID FILLED ORAL at 20:04

## 2022-08-11 RX ADMIN — WITCH HAZEL 1 PAD: 500 SOLUTION RECTAL; TOPICAL at 01:35

## 2022-08-11 NOTE — L&D DELIVERY NOTE
8/10/2022    Patient:Jenny Brunson    MR#:[id number]    Vaginal Delivery Note  22 y.o. yo female  at 39w6d    Patient Active Problem List   Diagnosis   • Prenatal care, first pregnancy in second trimester   • Decreased fetal movement   • Depression   • 35 weeks gestation of pregnancy   • BMI 32.0-32.9,adult   • Pregnancy   • False labor after 37 weeks of gestation without delivery       Delivery     Delivery: Vaginal, Spontaneous     YOB: 2020    Time of Birth: 10:28 PM      Anesthesia: Epidural     Delivering clinician: Itzel Alvarez    Forceps?   No   Vacuum? No    Shoulder dystocia present: No          Infant    Findings: female  infant     Infant observations: Weight: 2850 g (6 lb 4.5 oz)     Observations/Comments:        Apgars: 8  @ 1 minute /    9  @ 5 minutes         Placenta, Cord, and Fluid    Placenta delivered  Spontaneous  at  8/10/2022 10:35 PM     Cord: 3 vessels  present.   Nuchal Cord?  yes; Number of nuchal loops present:  1    Cord blood obtained: Yes    Cord gases obtained:  No    Cord gas results: Pending         Repair    Episiotomy: No   Lacerations: Yes  Laceration Information  Laceration Repaired?   Perineal: None      Periurethral:       Labial: right  No    Sulcus:       Vaginal: Yes  Yes    Cervical:         Suture used for repair: 3-0 Vicryl   Estimated Blood Loss:  100 mls.         Complications  none    Disposition  Mother to Mother Baby/Postpartum  in stable condition currently.  Baby to remains with mom  in stable condition currently.    Description of Delivery  Patient pushed well x 30-60 min and delivered easily from OA presentation.  There was a nuchal x 1, reduced at perineum.  Shoulders delivered easily. Nose and mouth was bulb suctioned and baby was placed on mother's abdomen.  Cord was milked,  clamped and cut.  Repair of right vaginal laceration with 3-0 vicryl figure 8 was done next.  Placenta delivered easily and with pitocin  bolus, there was no uterine atony.       Itzel Alvarez MD  08/10/22  23:02 EDT

## 2022-08-11 NOTE — ANESTHESIA POSTPROCEDURE EVALUATION
Patient: Jenny Brunson    Procedure Summary     Date: 08/10/22 Room / Location:     Anesthesia Start: 1649 Anesthesia Stop: 2235    Procedure: LABOR ANALGESIA Diagnosis:     Scheduled Providers:  Provider: Bj Aj DO    Anesthesia Type: epidural ASA Status: 2          Anesthesia Type: epidural    Vitals  Vitals Value Taken Time   /53 08/11/22 0530   Temp 97.9 °F (36.6 °C) 08/11/22 0530   Pulse 62 08/11/22 0530   Resp 18 08/11/22 0530   SpO2 99 % 08/10/22 2027           Post Anesthesia Care and Evaluation    Patient location during evaluation: bedside  Patient participation: complete - patient participated  Level of consciousness: awake and alert  Pain management: adequate    Airway patency: patent  Anesthetic complications: No anesthetic complications    Cardiovascular status: acceptable  Respiratory status: acceptable  Hydration status: acceptable  Post Neuraxial Block status: Motor and sensory function returned to baseline and No signs or symptoms of PDPH

## 2022-08-11 NOTE — PROGRESS NOTES
Alejandra Brunson  : 2000  MRN: [id number]  CSN: [id number]    Postpartum Day #1  Subjective   Her pain is well controlled.  Vaginal bleeding is appropriate amount.  She is passing gas and has not had a bowel movement.     Objective     Min/max vitals past 24 hours:   Temp  Min: 97.7 °F (36.5 °C)  Max: 99.1 °F (37.3 °C)  BP  Min: 108/59  Max: 153/76  Pulse  Min: 54  Max: 120  Pulse  Min: 54  Max: 120        General: well developed; well nourished  no acute distress   Abdomen: soft, non-tender; no masses  no umbilical or inguinal hernias are present  no hepato-splenomegaly   Pelvic: Not performed   Ext: Calves NT     Lab Results   Component Value Date    WBC 12.59 (H) 08/10/2022    HGB 12.1 08/10/2022    HCT 35.5 08/10/2022    MCV 78.7 (L) 08/10/2022     08/10/2022    ABORH A Positive 01/10/2022    RH Positive 08/10/2022    HEPBSAG Negative 01/10/2022        Assessment   1. Postpartum Day #1 S/P vaginal delivery   2. Infant is female, breastfeeding     Plan   1. Continue routine postpartum care   2. CBC pending    Carlyle Newman, APRN  2022  08:49 EDT

## 2022-08-11 NOTE — LACTATION NOTE
22 0819   Maternal Information   Date of Referral 22   Person Making Referral lactation consultant   Maternal Reason for Referral no prior breastfeeding experience   Infant Reason for Referral  infant   Maternal Assessment   Breast Size Issue none   Breast Shape Bilateral:;round   Maternal Infant Feeding   Maternal Emotional State independent;receptive;relaxed   Latch Assistance verbal guidance offered   Support Person Involvement actively supporting mother   Milk Expression/Equipment   Breast Pump Type double electric, personal   Equipment for Home Use pump not needed at this time  (has personal Medela)   Breast Pumping   Breast Pumping Interventions early pumping promoted;post-feed pumping encouraged  (encouraged to pump for short/missed feedings)   Breast Pumping   (has personal Medela pump)   First-time mother; mother reporting all is going well with nursing infant; did not witness infant latch; went over educational materials; has personal Medela pump; mother will call lactation if needs any assistance.

## 2022-08-11 NOTE — PLAN OF CARE
Goal Outcome Evaluation:                 Problem: Breastfeeding  Goal: Effective Breastfeeding  Outcome: Ongoing, Progressing  Intervention: Promote Breast Care and Comfort  Flowsheets (Taken 8/11/2022 0819)  Breast Pumping: (has personal Medela pump) --  Intervention: Support Exclusive Breastfeeding Success  Flowsheets (Taken 8/11/2022 0819)  Supportive Measures:   active listening utilized   counseling provided   positive reinforcement provided  Breastfeeding Support:   lactation counseling provided   encouragement provided  Intervention: Promote Effective Breastfeeding  Flowsheets (Taken 8/11/2022 0819)  Breastfeeding Assistance:   feeding on demand promoted   feeding cue recognition promoted   support offered  Parent/Child Attachment Promotion:   positive reinforcement provided   caring behavior modeled   cue recognition promoted   skin-to-skin contact encouraged   strengths emphasized

## 2022-08-12 VITALS
OXYGEN SATURATION: 99 % | TEMPERATURE: 98.2 F | HEART RATE: 52 BPM | SYSTOLIC BLOOD PRESSURE: 109 MMHG | RESPIRATION RATE: 18 BRPM | DIASTOLIC BLOOD PRESSURE: 55 MMHG

## 2022-08-12 PROBLEM — O36.8190 DECREASED FETAL MOVEMENT: Status: RESOLVED | Noted: 2022-06-14 | Resolved: 2022-08-12

## 2022-08-12 PROBLEM — Z34.02 PRENATAL CARE, FIRST PREGNANCY IN SECOND TRIMESTER: Status: RESOLVED | Noted: 2022-04-13 | Resolved: 2022-08-12

## 2022-08-12 PROBLEM — Z3A.35 35 WEEKS GESTATION OF PREGNANCY: Status: RESOLVED | Noted: 2022-07-13 | Resolved: 2022-08-12

## 2022-08-12 LAB
BASOPHILS # BLD AUTO: 0.03 10*3/MM3 (ref 0–0.2)
BASOPHILS NFR BLD AUTO: 0.3 % (ref 0–1.5)
DEPRECATED RDW RBC AUTO: 46.8 FL (ref 37–54)
EOSINOPHIL # BLD AUTO: 0.1 10*3/MM3 (ref 0–0.4)
EOSINOPHIL NFR BLD AUTO: 1 % (ref 0.3–6.2)
ERYTHROCYTE [DISTWIDTH] IN BLOOD BY AUTOMATED COUNT: 15.7 % (ref 12.3–15.4)
HCT VFR BLD AUTO: 32.5 % (ref 34–46.6)
HGB BLD-MCNC: 10.6 G/DL (ref 12–15.9)
IMM GRANULOCYTES # BLD AUTO: 0.05 10*3/MM3 (ref 0–0.05)
IMM GRANULOCYTES NFR BLD AUTO: 0.5 % (ref 0–0.5)
LYMPHOCYTES # BLD AUTO: 2.78 10*3/MM3 (ref 0.7–3.1)
LYMPHOCYTES NFR BLD AUTO: 28.7 % (ref 19.6–45.3)
MCH RBC QN AUTO: 26.7 PG (ref 26.6–33)
MCHC RBC AUTO-ENTMCNC: 32.6 G/DL (ref 31.5–35.7)
MCV RBC AUTO: 81.9 FL (ref 79–97)
MONOCYTES # BLD AUTO: 0.75 10*3/MM3 (ref 0.1–0.9)
MONOCYTES NFR BLD AUTO: 7.7 % (ref 5–12)
NEUTROPHILS NFR BLD AUTO: 5.98 10*3/MM3 (ref 1.7–7)
NEUTROPHILS NFR BLD AUTO: 61.8 % (ref 42.7–76)
NRBC BLD AUTO-RTO: 0 /100 WBC (ref 0–0.2)
PLATELET # BLD AUTO: 183 10*3/MM3 (ref 140–450)
PMV BLD AUTO: 10.4 FL (ref 6–12)
RBC # BLD AUTO: 3.97 10*6/MM3 (ref 3.77–5.28)
RPR SER QL: NORMAL
WBC NRBC COR # BLD: 9.69 10*3/MM3 (ref 3.4–10.8)

## 2022-08-12 PROCEDURE — 86592 SYPHILIS TEST NON-TREP QUAL: CPT | Performed by: OBSTETRICS & GYNECOLOGY

## 2022-08-12 PROCEDURE — 85025 COMPLETE CBC W/AUTO DIFF WBC: CPT | Performed by: OBSTETRICS & GYNECOLOGY

## 2022-08-12 PROCEDURE — 0503F POSTPARTUM CARE VISIT: CPT | Performed by: NURSE PRACTITIONER

## 2022-08-12 RX ORDER — IBUPROFEN 600 MG/1
600 TABLET ORAL EVERY 6 HOURS SCHEDULED
Qty: 30 TABLET | Refills: 0 | Status: SHIPPED | OUTPATIENT
Start: 2022-08-12

## 2022-08-12 RX ADMIN — IBUPROFEN 600 MG: 600 TABLET ORAL at 06:14

## 2022-08-12 RX ADMIN — DOCUSATE SODIUM 100 MG: 100 CAPSULE, LIQUID FILLED ORAL at 08:16

## 2022-08-12 RX ADMIN — IBUPROFEN 600 MG: 600 TABLET ORAL at 00:34

## 2022-08-12 RX ADMIN — IBUPROFEN 600 MG: 600 TABLET ORAL at 11:52

## 2022-08-12 RX ADMIN — SERTRALINE HYDROCHLORIDE 50 MG: 50 TABLET ORAL at 11:52

## 2022-08-12 NOTE — LACTATION NOTE
08/12/22 0820   Maternal Information   Date of Referral 08/12/22   Person Making Referral nurse   Maternal Reason for Referral nipple symptoms  (sore and bleeding nipples)   Maternal Assessment   Breast Size Issue none   Breast Shape Bilateral:;round   Breast Density Bilateral:;dense   Nipples Bilateral:;everted;short   Left Nipple Symptoms cracked;painful;redness;other (see comments)  (Nipples appear cracked close to base. There may be popped blisters.)   Right Nipple Symptoms redness;tender   Maternal Infant Feeding   Maternal Emotional State receptive;tense   Infant Positioning cross-cradle   Signs of Milk Transfer other (see comments)  (Baby is sluggish at the breast.  Mom was advised to take off baby's clothes and hold her skin-to-skin if she does not stay engaged at the breast.)   Pain with Feeding no  (Mom is going to use a small nipple shield until her nipple gets better.  Nipple care was discussed. She was encouraged to apply expressed breast milk to nipples after breastfeeding and allow them to air dry.  She has gel pads.)   Comfort Measures Before/During Feeding infant position adjusted;maternal position adjusted   Latch Assistance minimal assistance     Mom was encouraged to follow-up in the outpatient lactation clinic, if needed.

## 2022-08-12 NOTE — PLAN OF CARE
Problem: Adult Inpatient Plan of Care  Goal: Plan of Care Review  Outcome: Met  Goal: Patient-Specific Goal (Individualized)  Outcome: Met  Goal: Absence of Hospital-Acquired Illness or Injury  Outcome: Met  Intervention: Identify and Manage Fall Risk  Recent Flowsheet Documentation  Taken 8/12/2022 0930 by Clarisse De RN  Safety Promotion/Fall Prevention: safety round/check completed  Taken 8/12/2022 0815 by Clarisse De RN  Safety Promotion/Fall Prevention: safety round/check completed  Intervention: Prevent Skin Injury  Recent Flowsheet Documentation  Taken 8/12/2022 0815 by Clarisse De RN  Body Position: side-lying  Intervention: Prevent and Manage VTE (Venous Thromboembolism) Risk  Recent Flowsheet Documentation  Taken 8/12/2022 0930 by Clarisse De RN  Activity Management: up ad hayden  Goal: Optimal Comfort and Wellbeing  Outcome: Met  Goal: Readiness for Transition of Care  Outcome: Met   Goal Outcome Evaluation:

## 2022-08-12 NOTE — DISCHARGE SUMMARY
Discharge Summary    Date of Admission: 2022  Date of Discharge:  2022      Patient: Jenny Brunson      MR#:[id number]    Delivery Provider: Itzel Alvarez       Presenting Problem/History of Present Illness  Pregnancy [Z34.90]     Patient Active Problem List   Diagnosis   • Depression   • BMI 32.0-32.9,adult   • Pregnancy   • False labor after 37 weeks of gestation without delivery         Discharge Diagnosis: Vaginal delivery at 39w6d    Procedures:  Vaginal, Spontaneous     8/10/2022    10:28 PM        Hospital Course  Patient is a 22 y.o. female  at 39w6d status post vaginal delivery without complication. Postpartum the patient did well. She remained afebrile, with vital signs stable. She was ready for discharge on postpartum day 2.     Infant:   female  fetus 2850 g (6 lb 4.5 oz)  with Apgar scores of 8 , 9  at five minutes.    Condition on Discharge:  Stable    Vital Signs  Temp:  [97.9 °F (36.6 °C)-98.5 °F (36.9 °C)] 97.9 °F (36.6 °C)  Heart Rate:  [77-82] 77  Resp:  [16-18] 16  BP: (119-121)/(59-67) 121/59    Lab Results   Component Value Date    WBC 9.69 2022    HGB 10.6 (L) 2022    HCT 32.5 (L) 2022    MCV 81.9 2022     2022       Discharge Disposition  Home or Self Care    Discharge Medications     Discharge Medications      New Medications      Instructions Start Date   ibuprofen 600 MG tablet  Commonly known as: ADVIL,MOTRIN   600 mg, Oral, Every 6 Hours Scheduled         Continue These Medications      Instructions Start Date   albuterol sulfate  (90 Base) MCG/ACT inhaler  Commonly known as: PROVENTIL HFA;VENTOLIN HFA;PROAIR HFA   2 puffs, Inhalation, Every 4 Hours PRN      ferrous sulfate 325 (65 FE) MG tablet   325 mg, Oral, Daily With Breakfast      Prenatal 27-1 27-1 MG tablet tablet   1 tablet, Oral, Daily             Follow-up Appointments  No future appointments.  Additional Instructions for the Follow-ups that You Need  to Schedule     Discharge Follow-up with Specified Provider: uri; 6 Weeks   As directed      To: uri    Follow Up: 6 Weeks               Luz Rodriguez, FRANCISCO JAVIER  08/12/22  08:29 EDT  Csd

## 2022-08-17 ENCOUNTER — PATIENT OUTREACH (OUTPATIENT)
Dept: LABOR AND DELIVERY | Facility: HOSPITAL | Age: 22
End: 2022-08-17

## 2022-08-17 NOTE — OUTREACH NOTE
Motherhood Connection  Postpartum Check-In    Questions/Answers    Flowsheet Row Responses   Visit Setting Telephone   Best Method for Contacting Cell   OB Discharge Note Reviewed  Reviewed   OB Discharge Navigator Reviewed  Reviewed   OB Discharge Medications Reviewed  Reviewed   [place] discharged home with mother? Yes   Current Pain Levels 0-10 2   At Rest Pain Levels 0-10 1   Pain level with activity 0-10 2   Acceptable Pain Level 0-10 0   Pain Location Perineum   Verbalized Emotional State Acceptance   Family/Support Network Mother   Level of Involvement in Care Attentive, Supportive   Do you feel comfortable in your relationship with your baby? Yes   Have members of your household adjusted to your baby? Yes   Is the baby's father supportive and/or involved with the baby? Yes  [He is currently in inpatient rehab but has been able to see the baby and is very supportive]   How does your partner feel about the baby? Happy   Do you feel safe at home, school and work? Yes   Are you in a relationship with someone who threatens you or hurts you? No   Do you have the resources to keep yourself and your baby healthy and safe? Yes   Lochia (per patient report) Brown-carmine Red   Amount Scant   Number of pads per day 2   Lochia Odor None   Is patient breastfeeding? Yes, pumping  [hand expressing]   Breast Care Breast Pads, Supportive Bra   pumping - Left Breast Nontender, Soft   Pumping - Right Breast Nontender, Soft   Pumping - Left Nipple Intact, Nontender   Pumping - Right Nipple Intact, Nontender   Frequency q 2 hrs   Pumping Quantity 5 oz.   Storage of Milk refrigerator, using oldest supply first   Postpartum Depression Screening Education Education Provided   Doctor Appointments: Education Provided   Breastfeeding Education Education Provided   Family Planning Education Education Provided   Postpartum Care Education Education Provided   S & S to report Education Provided   Well Child Visit Appointments Made Yes   Well  Child Checkup Provider Name BG Harkins, has been back for weight recheck and she is gaining weight   Did you complete the visit? Yes   Were there any specific concerns? No   Umbilical Cord No reported signs or symptoms   Feeding Readiness Cues: Cooing, Crying, Energy for feeding   Infant Feeding Method Expressed Breast Milk   Is a lactation referral indicated? No   Expressed milk PO (mL) getting 5 oz. each time with hand expression, baby is taking 2 oz. per feeding   Expressed milk- frequency of feedings 2-3 hrs.   Number of wet diapers x 24 hours 10   Last BM x 24 hours 8   What safe sleep surface is available? Crib   Are there stuffed animals, toys, pillows, quilts, blankets, wedges, positioners, bumpers or other loose bedding in the infant's sleeping environment? No   Where does the baby usually sleep? Crib   Does the baby ever share a sleep surface with a sibling, adult or pet? No   Does the baby ever share a sleep surface in a bed, couch, recliner or other? No   What position do you place your baby to sleep for naps? Back   What position do you place your baby to sleep at night Back   Are you and/or other caregivers smoking inside or outside the baby's home? No   Is the infant dressed appropiately for the temperature of the home? No   Do you use a clean, dry pacifier that is not attached to a string or stuffed animal? Yes          Review of Systems   Constitutional: Negative.    All other systems reviewed and are negative.      [place]  Depression Score: 0 (2022  3:01 PM)    Jenny restarted Zoloft.    Becki Keys RN  Maternity Nurse Navigator    2022, 15:08 EDT

## 2022-08-24 ENCOUNTER — PATIENT OUTREACH (OUTPATIENT)
Dept: CALL CENTER | Facility: HOSPITAL | Age: 22
End: 2022-08-24

## 2022-08-24 NOTE — OUTREACH NOTE
Motherhood Connection Survey    Flowsheet Row Responses   [facility] patient discharged from? [place]   Week 1 attempt successful? Yes   Call start time 1103   Call end time 1108   Baby sex Girl   Baby sex Girl   Delivery type Vaginal   Emotional state Acceptance   Family support Yes   Do you have all necessary resources to care for you and your baby?  Yes   Have members of your household adjusted to your baby? Yes   Did you have any problems with pre-eclampsia during this pregnancy? No   Did you have blood glucose issues during this pregnancy No   Lochia amount Light   Did you have an episiotomy/tear/abdominal incision? Yes   Additional comments 1 stitch   Feeding Method Breast   Frequency 2 oz   Duration 2-3hr   Breast Condition No   Nipple Condition No   Signs baby is ready to eat Rooting, Finger sucking, Crying   Feeding tolerance Wet/dirty diapers   Number of wet diapers x 24 hours 8-10   Last BM x 24 hours 5-6   Umbilical Cord No reported signs or symptoms   Umbilical cord comments some blood on diaper   Where does the baby usually sleep? Crib   Are there stuffed animals, toys, pillows, quilts, blankets, wedges, positioners, bumpers or other loose bedding in the infant's sleeping environment? No   Does the baby ever share a sleep surface in a bed, couch, recliner or other? No   What position do you lay your baby down to sleep? Back   Mom appointment comments: 6 week checkup   Baby appointment comments: 8/25/22   Call completed? Yes   How satisfied were you with the Motherhood Connection Program? 5   Anyone you would like to recognize from your time in the Motherhood Connection Program  was supportive            RELL Mortensen Nurse

## 2022-11-14 ENCOUNTER — POSTPARTUM VISIT (OUTPATIENT)
Dept: OBSTETRICS AND GYNECOLOGY | Facility: CLINIC | Age: 22
End: 2022-11-14

## 2022-11-14 VITALS
HEIGHT: 68 IN | WEIGHT: 222.6 LBS | SYSTOLIC BLOOD PRESSURE: 110 MMHG | DIASTOLIC BLOOD PRESSURE: 64 MMHG | BODY MASS INDEX: 33.74 KG/M2

## 2022-11-14 PROCEDURE — 0503F POSTPARTUM CARE VISIT: CPT | Performed by: OBSTETRICS & GYNECOLOGY

## 2023-01-15 PROCEDURE — 93005 ELECTROCARDIOGRAM TRACING: CPT | Performed by: EMERGENCY MEDICINE

## 2023-01-15 PROCEDURE — 93005 ELECTROCARDIOGRAM TRACING: CPT

## 2023-01-15 PROCEDURE — 99283 EMERGENCY DEPT VISIT LOW MDM: CPT

## 2023-01-15 RX ORDER — ASPIRIN 81 MG/1
324 TABLET, CHEWABLE ORAL ONCE
Status: DISCONTINUED | OUTPATIENT
Start: 2023-01-15 | End: 2023-01-16

## 2023-01-15 RX ORDER — SODIUM CHLORIDE 0.9 % (FLUSH) 0.9 %
10 SYRINGE (ML) INJECTION AS NEEDED
Status: DISCONTINUED | OUTPATIENT
Start: 2023-01-15 | End: 2023-01-16 | Stop reason: HOSPADM

## 2023-01-16 ENCOUNTER — HOSPITAL ENCOUNTER (EMERGENCY)
Facility: HOSPITAL | Age: 23
Discharge: HOME | End: 2023-01-16
Attending: EMERGENCY MEDICINE | Admitting: EMERGENCY MEDICINE
Payer: MEDICAID

## 2023-01-16 ENCOUNTER — APPOINTMENT (OUTPATIENT)
Dept: GENERAL RADIOLOGY | Facility: HOSPITAL | Age: 23
End: 2023-01-16
Payer: MEDICAID

## 2023-01-16 VITALS
HEIGHT: 68 IN | SYSTOLIC BLOOD PRESSURE: 127 MMHG | BODY MASS INDEX: 33.75 KG/M2 | OXYGEN SATURATION: 99 % | DIASTOLIC BLOOD PRESSURE: 90 MMHG | WEIGHT: 222.66 LBS | HEART RATE: 87 BPM | TEMPERATURE: 97.5 F | RESPIRATION RATE: 16 BRPM

## 2023-01-16 DIAGNOSIS — R06.02 SHORTNESS OF BREATH: ICD-10-CM

## 2023-01-16 DIAGNOSIS — B34.9 ACUTE VIRAL SYNDROME: Primary | ICD-10-CM

## 2023-01-16 DIAGNOSIS — U07.1 COVID-19 VIRUS INFECTION: ICD-10-CM

## 2023-01-16 DIAGNOSIS — Z87.09 HISTORY OF ASTHMA: ICD-10-CM

## 2023-01-16 LAB
ALBUMIN SERPL-MCNC: 4.1 G/DL (ref 3.5–5.2)
ALBUMIN/GLOB SERPL: 1.5 G/DL
ALP SERPL-CCNC: 61 U/L (ref 39–117)
ALT SERPL W P-5'-P-CCNC: 12 U/L (ref 1–33)
ANION GAP SERPL CALCULATED.3IONS-SCNC: 9 MMOL/L (ref 5–15)
AST SERPL-CCNC: 12 U/L (ref 1–32)
BACTERIA UR QL AUTO: ABNORMAL /HPF
BASOPHILS # BLD AUTO: 0.03 10*3/MM3 (ref 0–0.2)
BASOPHILS NFR BLD AUTO: 0.5 % (ref 0–1.5)
BILIRUB SERPL-MCNC: <0.2 MG/DL (ref 0–1.2)
BILIRUB UR QL STRIP: NEGATIVE
BUN SERPL-MCNC: 9 MG/DL (ref 6–20)
BUN/CREAT SERPL: 10.7 (ref 7–25)
CALCIUM SPEC-SCNC: 8.7 MG/DL (ref 8.6–10.5)
CHLORIDE SERPL-SCNC: 107 MMOL/L (ref 98–107)
CLARITY UR: ABNORMAL
CO2 SERPL-SCNC: 25 MMOL/L (ref 22–29)
COLOR UR: YELLOW
CREAT SERPL-MCNC: 0.84 MG/DL (ref 0.57–1)
D DIMER PPP FEU-MCNC: <0.27 MCGFEU/ML (ref 0–0.5)
DEPRECATED RDW RBC AUTO: 40.9 FL (ref 37–54)
EGFRCR SERPLBLD CKD-EPI 2021: 102.2 ML/MIN/1.73
EOSINOPHIL # BLD AUTO: 0.04 10*3/MM3 (ref 0–0.4)
EOSINOPHIL NFR BLD AUTO: 0.7 % (ref 0.3–6.2)
ERYTHROCYTE [DISTWIDTH] IN BLOOD BY AUTOMATED COUNT: 13.5 % (ref 12.3–15.4)
FLUAV RNA RESP QL NAA+PROBE: NOT DETECTED
FLUBV RNA RESP QL NAA+PROBE: NOT DETECTED
GLOBULIN UR ELPH-MCNC: 2.8 GM/DL
GLUCOSE SERPL-MCNC: 103 MG/DL (ref 65–99)
GLUCOSE UR STRIP-MCNC: NEGATIVE MG/DL
HCT VFR BLD AUTO: 39.9 % (ref 34–46.6)
HGB BLD-MCNC: 13.1 G/DL (ref 12–15.9)
HGB UR QL STRIP.AUTO: NEGATIVE
HOLD SPECIMEN: NORMAL
HYALINE CASTS UR QL AUTO: ABNORMAL /LPF
IMM GRANULOCYTES # BLD AUTO: 0 10*3/MM3 (ref 0–0.05)
IMM GRANULOCYTES NFR BLD AUTO: 0 % (ref 0–0.5)
KETONES UR QL STRIP: NEGATIVE
LEUKOCYTE ESTERASE UR QL STRIP.AUTO: NEGATIVE
LIPASE SERPL-CCNC: 32 U/L (ref 13–60)
LYMPHOCYTES # BLD AUTO: 2.68 10*3/MM3 (ref 0.7–3.1)
LYMPHOCYTES NFR BLD AUTO: 44 % (ref 19.6–45.3)
MCH RBC QN AUTO: 27.3 PG (ref 26.6–33)
MCHC RBC AUTO-ENTMCNC: 32.8 G/DL (ref 31.5–35.7)
MCV RBC AUTO: 83.3 FL (ref 79–97)
MONOCYTES # BLD AUTO: 0.66 10*3/MM3 (ref 0.1–0.9)
MONOCYTES NFR BLD AUTO: 10.8 % (ref 5–12)
NEUTROPHILS NFR BLD AUTO: 2.68 10*3/MM3 (ref 1.7–7)
NEUTROPHILS NFR BLD AUTO: 44 % (ref 42.7–76)
NITRITE UR QL STRIP: NEGATIVE
NRBC BLD AUTO-RTO: 0 /100 WBC (ref 0–0.2)
NT-PROBNP SERPL-MCNC: 5 PG/ML (ref 0–450)
PH UR STRIP.AUTO: 5.5 [PH] (ref 5–8)
PLATELET # BLD AUTO: 263 10*3/MM3 (ref 140–450)
PMV BLD AUTO: 9.2 FL (ref 6–12)
POTASSIUM SERPL-SCNC: 3.6 MMOL/L (ref 3.5–5.2)
PROT SERPL-MCNC: 6.9 G/DL (ref 6–8.5)
PROT UR QL STRIP: NEGATIVE
QT INTERVAL: 338 MS
QTC INTERVAL: 415 MS
RBC # BLD AUTO: 4.79 10*6/MM3 (ref 3.77–5.28)
RBC # UR STRIP: ABNORMAL /HPF
REF LAB TEST METHOD: ABNORMAL
RSV RNA NPH QL NAA+NON-PROBE: NOT DETECTED
SARS-COV-2 RNA RESP QL NAA+PROBE: DETECTED
SODIUM SERPL-SCNC: 141 MMOL/L (ref 136–145)
SP GR UR STRIP: 1.02 (ref 1–1.03)
SQUAMOUS #/AREA URNS HPF: ABNORMAL /HPF
TROPONIN T SERPL-MCNC: <0.01 NG/ML (ref 0–0.03)
UROBILINOGEN UR QL STRIP: ABNORMAL
WBC # UR STRIP: ABNORMAL /HPF
WBC NRBC COR # BLD: 6.09 10*3/MM3 (ref 3.4–10.8)
WHOLE BLOOD HOLD COAG: NORMAL
WHOLE BLOOD HOLD SPECIMEN: NORMAL

## 2023-01-16 PROCEDURE — 84484 ASSAY OF TROPONIN QUANT: CPT | Performed by: EMERGENCY MEDICINE

## 2023-01-16 PROCEDURE — 83880 ASSAY OF NATRIURETIC PEPTIDE: CPT | Performed by: EMERGENCY MEDICINE

## 2023-01-16 PROCEDURE — 81001 URINALYSIS AUTO W/SCOPE: CPT | Performed by: PHYSICIAN ASSISTANT

## 2023-01-16 PROCEDURE — 36415 COLL VENOUS BLD VENIPUNCTURE: CPT

## 2023-01-16 PROCEDURE — 71045 X-RAY EXAM CHEST 1 VIEW: CPT

## 2023-01-16 PROCEDURE — 83690 ASSAY OF LIPASE: CPT | Performed by: EMERGENCY MEDICINE

## 2023-01-16 PROCEDURE — 85379 FIBRIN DEGRADATION QUANT: CPT | Performed by: EMERGENCY MEDICINE

## 2023-01-16 PROCEDURE — 85025 COMPLETE CBC W/AUTO DIFF WBC: CPT | Performed by: EMERGENCY MEDICINE

## 2023-01-16 PROCEDURE — 87637 SARSCOV2&INF A&B&RSV AMP PRB: CPT | Performed by: PHYSICIAN ASSISTANT

## 2023-01-16 PROCEDURE — 80053 COMPREHEN METABOLIC PANEL: CPT | Performed by: EMERGENCY MEDICINE

## 2023-01-16 NOTE — DISCHARGE INSTRUCTIONS
If you tested POSITIVE for COVID-19 stay home for 5 days and isolate from others in your home.     Wear a well-fitted mask if you must be around others in your home.    Do Not Travel    End isolation after 5 full days if you are fever-free for 24 hours (without the use of fever-reducing medication) and your symptoms are improving.    If you were severely ill with COVID-19 or are immunocompromised  You should isolate for at least 10 days. Consult your doctor before ending isolation.    Take precautions until day 10. Wear a well-fitted mask for 10 full days any time you are around others inside your home or in public. Do not go to places where you are unable to wear a mask.  Do not travel until a full 10 days after your symptoms started or the date your positive test was taken if you had no symptoms. Avoid being around people who are at high risk

## 2023-01-16 NOTE — Clinical Note
[facility] EMERGENCY DEPARTMENT  [address] [address]  Phone: [phone]    Jenny Brunson was seen and treated in our emergency department on 1/15/2023.  She may return to school on 01/23/2023.          Thank you for choosing [facility].    Ty Toribio, PA

## 2023-01-24 NOTE — ED PROVIDER NOTES
EMERGENCY DEPARTMENT ENCOUNTER    Pt Name: Jenny Brunson  MRN: [id number]  Pt :   2000  Room Number:    Date of encounter:  1/15/2023  PCP: Provider, No Known  ED Provider: PHI Serna    Historian: Patient    HPI:  Chief Complaint: Chest pain, shortness of breath and congestion    Context: Jenny Brunson is a 22 y.o. female who presents to the ED c/o shortness of breath, fatigue, congestion, headaches and cough.  Patient reports that she was seen and evaluated at urgent care a few days ago and was given a steroid shot and prescribed allergy medications and inhaler.  Patient does have past medical history significant for asthma.  She reports that since her visit to the [facility] that her symptoms have not improved.  No additional associated symptoms on exam  HPI     REVIEW OF SYSTEMS  A chief complaint appropriate review of systems was completed and is negative except as noted in the HPI.     PAST MEDICAL HISTORY  Past Medical History:   Diagnosis Date   • Anxiety    • Asthma    • Depression    • Ovarian cyst     Was about 2 years ago   • Tonsil, abscess        PAST SURGICAL HISTORY  Past Surgical History:   Procedure Laterality Date   • TONSILLECTOMY  2017    due to abscess   • WISDOM TOOTH EXTRACTION         FAMILY HISTORY  Family History   Problem Relation Age of Onset   • Skin cancer Mother    • Autism Brother    • Bipolar disorder Brother    • Psychosis Brother        SOCIAL HISTORY  Social History     Socioeconomic History   • Marital status: Single   • Number of children: 0   • Highest education level: High school graduate   Tobacco Use   • Smoking status: Every Day     Packs/day: 0.00     Years: 2.00     Pack years: 0.00     Types: Electronic Cigarette, Cigarettes   • Smokeless tobacco: Current   • Tobacco comments:     vapes +nicotine   Vaping Use   • Vaping Use: Every day   • Substances: Nicotine   • Devices: Pre-filled or refillable cartridge   Substance and Sexual Activity    • Alcohol use: Not Currently   • Drug use: Never   • Sexual activity: Yes     Partners: Male     Birth control/protection: None       ALLERGIES  Patient has no known allergies.    PHYSICAL EXAM  Physical Exam  GENERAL:   Appears in no acute distress.   HENT: Nares patent.  Nasal congestion.  EYES: No scleral icterus.  CV: Regular rhythm, regular rate.  RESPIRATORY: Normal effort.  No audible wheezes, rales or rhonchi.  ABDOMEN: Soft, nontender  MUSCULOSKELETAL: No deformities.   NEURO: Alert, moves all extremities, follows commands.  SKIN: Warm, dry, no rash visualized.    I have reviewed the triage vital signs and nursing notes.    Physical Exam     LAB RESULTS  Results for orders placed or performed during the hospital encounter of 01/16/23   COVID-19, FLU A/B, RSV PCR - Swab, Nasopharynx    Specimen: Nasopharynx; Swab   Result Value Ref Range    COVID19 Detected (C) Not Detected - Ref. Range    Influenza A PCR Not Detected Not Detected    Influenza B PCR Not Detected Not Detected    RSV, PCR Not Detected Not Detected   Troponin    Specimen: Blood   Result Value Ref Range    Troponin T <0.010 0.000 - 0.030 ng/mL   Comprehensive Metabolic Panel    Specimen: Blood   Result Value Ref Range    Glucose 103 (H) 65 - 99 mg/dL    BUN 9 6 - 20 mg/dL    Creatinine 0.84 0.57 - 1.00 mg/dL    Sodium 141 136 - 145 mmol/L    Potassium 3.6 3.5 - 5.2 mmol/L    Chloride 107 98 - 107 mmol/L    CO2 25.0 22.0 - 29.0 mmol/L    Calcium 8.7 8.6 - 10.5 mg/dL    Total Protein 6.9 6.0 - 8.5 g/dL    Albumin 4.1 3.5 - 5.2 g/dL    ALT (SGPT) 12 1 - 33 U/L    AST (SGOT) 12 1 - 32 U/L    Alkaline Phosphatase 61 39 - 117 U/L    Total Bilirubin <0.2 0.0 - 1.2 mg/dL    Globulin 2.8 gm/dL    A/G Ratio 1.5 g/dL    BUN/Creatinine Ratio 10.7 7.0 - 25.0    Anion Gap 9.0 5.0 - 15.0 mmol/L    eGFR 102.2 >60.0 mL/min/1.73   Lipase    Specimen: Blood   Result Value Ref Range    Lipase 32 13 - 60 U/L   BNP    Specimen: Blood   Result Value Ref Range     proBNP 5.0 0.0 - 450.0 pg/mL   CBC Auto Differential    Specimen: Blood   Result Value Ref Range    WBC 6.09 3.40 - 10.80 10*3/mm3    RBC 4.79 3.77 - 5.28 10*6/mm3    Hemoglobin 13.1 12.0 - 15.9 g/dL    Hematocrit 39.9 34.0 - 46.6 %    MCV 83.3 79.0 - 97.0 fL    MCH 27.3 26.6 - 33.0 pg    MCHC 32.8 31.5 - 35.7 g/dL    RDW 13.5 12.3 - 15.4 %    RDW-SD 40.9 37.0 - 54.0 fl    MPV 9.2 6.0 - 12.0 fL    Platelets 263 140 - 450 10*3/mm3    Neutrophil % 44.0 42.7 - 76.0 %    Lymphocyte % 44.0 19.6 - 45.3 %    Monocyte % 10.8 5.0 - 12.0 %    Eosinophil % 0.7 0.3 - 6.2 %    Basophil % 0.5 0.0 - 1.5 %    Immature Grans % 0.0 0.0 - 0.5 %    Neutrophils, Absolute 2.68 1.70 - 7.00 10*3/mm3    Lymphocytes, Absolute 2.68 0.70 - 3.10 10*3/mm3    Monocytes, Absolute 0.66 0.10 - 0.90 10*3/mm3    Eosinophils, Absolute 0.04 0.00 - 0.40 10*3/mm3    Basophils, Absolute 0.03 0.00 - 0.20 10*3/mm3    Immature Grans, Absolute 0.00 0.00 - 0.05 10*3/mm3    nRBC 0.0 0.0 - 0.2 /100 WBC   D-dimer, Quantitative    Specimen: Blood   Result Value Ref Range    D-Dimer, Quantitative <0.27 0.00 - 0.50 MCGFEU/mL   Urinalysis With Microscopic If Indicated (No Culture) - Urine, Clean Catch    Specimen: Urine, Clean Catch   Result Value Ref Range    Color, UA Yellow Yellow, Straw    Appearance, UA Cloudy (A) Clear    pH, UA 5.5 5.0 - 8.0    Specific Gravity, UA 1.024 1.001 - 1.030    Glucose, UA Negative Negative    Ketones, UA Negative Negative    Bilirubin, UA Negative Negative    Blood, UA Negative Negative    Protein, UA Negative Negative    Leuk Esterase, UA Negative Negative    Nitrite, UA Negative Negative    Urobilinogen, UA 0.2 E.U./dL 0.2 - 1.0 E.U./dL   Urinalysis, Microscopic Only - Urine, Clean Catch    Specimen: Urine, Clean Catch   Result Value Ref Range    RBC, UA 0-2 None Seen, 0-2 /HPF    WBC, UA 3-5 (A) None Seen, 0-2 /HPF    Bacteria, UA None Seen None Seen, Trace /HPF    Squamous Epithelial Cells, UA 3-6 (A) None Seen, 0-2 /HPF     Hyaline Casts, UA 0-6 0 - 6 /LPF    Methodology Automated Microscopy    ECG 12 Lead ED Triage Standing Order; Chest Pain   Result Value Ref Range    QT Interval 338 ms    QTC Interval 415 ms   Green Top (Gel)   Result Value Ref Range    Extra Tube Hold for add-ons.    Lavender Top   Result Value Ref Range    Extra Tube hold for add-on    Gold Top - SST   Result Value Ref Range    Extra Tube Hold for add-ons.    Gray Top   Result Value Ref Range    Extra Tube Hold for add-ons.    Light Blue Top   Result Value Ref Range    Extra Tube Hold for add-ons.        If labs were ordered, I independently reviewed the results and considered them in treating the patient.    RADIOLOGY  XR Chest 1 View   Final Result   1.  No consolidation or pleural effusion   2.  Heart size is normal.    3.  No acute bony findings.          Electronically signed by:  Juana Alegre M.D.     1/15/2023 10:53 PM Mountain Time        [x] Radiologist's Report Reviewed:  I ordered and independently reviewed the above noted radiographic studies.  See radiologist's dictation for official interpretation.      PROCEDURES    Procedures    ECG 12 Lead ED Triage Standing Order; Chest Pain   Final Result   Test Reason : ED Triage Standing Order~   Blood Pressure :   */*   mmHG   Vent. Rate :  91 BPM     Atrial Rate :  91 BPM      P-R Int : 146 ms          QRS Dur :  78 ms       QT Int : 338 ms       P-R-T Axes :  38  62  52 degrees      QTc Int : 415 ms      Normal sinus rhythm   Nonspecific ST and T wave abnormality   Abnormal ECG   No previous ECGs available   Confirmed by SHEKHAR CHONG ([id number]) on 1/16/2023 11:24:48 PM      Referred By: EDMD           Confirmed By: SHEKHAR CHONG          MEDICATIONS GIVEN IN ER    Medications - No data to display    MEDICAL DECISION MAKING, PROGRESS, and CONSULTS   Medical Decision Making  Acute viral syndrome: complicated acute illness or injury  COVID-19 virus infection: complicated acute illness or injury  History of  asthma: chronic illness or injury  Shortness of breath: complicated acute illness or injury  Amount and/or Complexity of Data Reviewed  Labs: ordered.  Radiology: ordered.  ECG/medicine tests: ordered.          All labs have been independently reviewed by me.  All radiology studies have been reviewed by me and the radiologist dictating the report.  All EKG's have been independently viewed by me.    [] Discussed with radiology regarding test interpretation:    Discussion below represents my analysis of pertinent findings related to patient's condition, differential diagnosis, treatment plan and final disposition.    Differential diagnosis:  The differential diagnosis associated with the patient's presentation includes: .ACS, CHF, pericardial effusion / tamponade, PE, pneumothorax , asthma, COPD exacerbation, allergic etiologies, infectious etiologies such as PNA and/or viral syndromes. Also considered non-cardiopulmonary causes to include toxidromes, metabolic etiologies such as acidemia or electrolyte derangements, sepsis, neurologic causes (i.e. demyelinating diseases).    Additional sources  Discussed/ obtained information from independent historians:   [] Spouse  [] Parent  [] Family member  [] Friend  [] EMS   [] Other:  External (non-ED) record review:   [] Inpatient record:   [] Office record:   [] Outpatient record:   [] Prior Outpatient labs:   [] Prior Outpatient radiology:   [] Primary Care record:   [] Outside ED record:   [x] Other: Brief review of patient's recent OB/GYN visits during pregnancy completed to contribute to patient's past medical history  Patient's care impacted by:   [] Diabetes  [] Hypertension  [] Hyperlipidemia  [] Coronary Artery Disease   [] COPD   [] Cancer   [] Tobacco Abuse   [] Substance Abuse    [x] Other: History of depression  Care significantly affected by Social Determinants of Health (housing and economic circumstances, unemployment)    [] Yes     [] No   If yes, Patient's  care significantly limited by  Social Determinants of Health including:   [] Inadequate housing   [] Low income   [] Alcoholism and drug addiction in family   [] Problems related to primary support group   [] Unemployment   [] Problems related to employment   [] Other Social Determinants of Health:     Shared decision making: I had a discussion with the patient/family regarding diagnosis, diagnostic results, treatment plan, and medications.  The patient/family indicated understanding of these instructions.  I spent adequate time at the bedside preceding discharge necessary to personally discuss the aftercare instructions, giving patient education, providing explanations of the results of our evaluations/findings, and my decision making to assure that the patient/family understand the plan of care.  Time was allotted to answer questions at that time and throughout the ED course.  Emphasis was placed on timely follow-up after discharge.  I also discussed the potential for the development of an acute emergent condition requiring further evaluation, admission, or even surgical intervention. I discussed that we found nothing during the visit today indicating the need for further workup, admission, or the presence of an unstable medical condition.  I encouraged the patient to return to the emergency department immediately for ANY concerns, worsening, new complaints, or if symptoms persist and unable to seek follow-up in a timely fashion.  The patient/family expressed understanding and agreement with this plan.  The patient will follow-up with primary care for reevaluation.     Orders placed during this visit:  Orders Placed This Encounter   Procedures   • COVID PRE-OP / PRE-PROCEDURE SCREENING ORDER (NO ISOLATION) - Swab, Nasopharynx   • COVID-19, FLU A/B, RSV PCR - Swab, Nasopharynx   • XR Chest 1 View   • [place] Draw   • Comprehensive Metabolic Panel   • Lipase   • BNP   • CBC Auto Differential   • D-dimer,  Quantitative   • Urinalysis With Microscopic If Indicated (No Culture) - Urine, Clean Catch   • Urinalysis, Microscopic Only - Urine, Clean Catch   • Undress & Gown   • Continuous Pulse Oximetry   • CBC & Differential   • Green Top (Gel)   • Lavender Top   • Gold Top - SST   • Gray Top   • Light Blue Top       I considered prescription management  with:   [] Pain medication  [x] Antiviral  [x] Antibiotic   [] Other:   Rationale: no clinical indication for treatment of viral illness, COVID-19    ED Course:    ED Course as of 01/24/23 1413   Mon Jan 16, 2023   0322 Patient presents with symptoms suspicious for likely viral upper respiratory infection. Differential includes bacterial pneumonia, sinusitis, allergic rhinitis, influenza and COVID-19. Do not suspect underlying cardiopulmonary process. I considered, but think unlikely, dangerous causes of this patient's symptoms to include ACS, CHF or COPD exacerbations, pneumonia, pneumothorax. Nasopharyngeal swab POSITIVE for COVID-19. Labs/urinalysis obtained and reviewed demonstrate no acute or emergent abnormalities.  Negative D-dimer. Imaging of chest demonstrated no acute cardiopulmonary process. EKG without evidence of acute ischemic changes. Patient is afebrile, vital signs stable, nontoxic appearing with oxygen saturation of 99% on room air. Patient not in need of emergent medical intervention. Will be discharged home with symptomatic care. Patient provided reassurance. Discharged with PCP follow up.   [JG]      ED Course User Index  [JG] Ty Toribio PA            DIAGNOSIS  Final diagnoses:   Acute viral syndrome   Shortness of breath   COVID-19 virus infection   History of asthma       DISPOSITION    ED Disposition     ED Disposition   Discharge    Condition   Stable    Comment   --             Please note that portions of this document were completed with voice recognition software.      Ty Toribio PA  01/24/23 1413

## 2023-04-13 ENCOUNTER — TELEPHONE (OUTPATIENT)
Dept: OBSTETRICS AND GYNECOLOGY | Facility: CLINIC | Age: 23
End: 2023-04-13
Payer: MEDICAID

## 2023-04-13 NOTE — TELEPHONE ENCOUNTER
PT is having issues with dehydration, diarrhea, stomach cramping and wanting to know if she can be seen here today.

## 2023-04-17 ENCOUNTER — INITIAL PRENATAL (OUTPATIENT)
Dept: OBSTETRICS AND GYNECOLOGY | Facility: CLINIC | Age: 23
End: 2023-04-17
Payer: MEDICAID

## 2023-04-17 ENCOUNTER — ANCILLARY ORDERS (OUTPATIENT)
Dept: OBSTETRICS AND GYNECOLOGY | Facility: CLINIC | Age: 23
End: 2023-04-17

## 2023-04-17 VITALS — WEIGHT: 237 LBS | SYSTOLIC BLOOD PRESSURE: 108 MMHG | BODY MASS INDEX: 36.04 KG/M2 | DIASTOLIC BLOOD PRESSURE: 58 MMHG

## 2023-04-17 DIAGNOSIS — F32.89 OTHER DEPRESSION: ICD-10-CM

## 2023-04-17 DIAGNOSIS — Z34.81 MULTIGRAVIDA IN FIRST TRIMESTER: Primary | ICD-10-CM

## 2023-04-17 DIAGNOSIS — Z34.90 PREGNANCY, UNSPECIFIED GESTATIONAL AGE: ICD-10-CM

## 2023-04-17 DIAGNOSIS — Z34.90 PREGNANCY AT EARLY STAGE: ICD-10-CM

## 2023-04-17 DIAGNOSIS — E66.9 OBESITY (BMI 30-39.9): ICD-10-CM

## 2023-04-17 PROBLEM — O47.1 FALSE LABOR AFTER 37 WEEKS OF GESTATION WITHOUT DELIVERY: Status: RESOLVED | Noted: 2022-07-31 | Resolved: 2023-04-17

## 2023-04-17 RX ORDER — PROMETHAZINE HYDROCHLORIDE 25 MG/1
25 TABLET ORAL EVERY 6 HOURS PRN
Qty: 30 TABLET | Refills: 0 | Status: SHIPPED | OUTPATIENT
Start: 2023-04-17

## 2023-04-17 NOTE — PROGRESS NOTES
......      Initial ob visit     CC- Here for care of pregnancy        Jenyn Brunson is a 22 y.o. female, , who presents for her first obstetrical visit.  Her last LMP was Patient's last menstrual period was 2023.. Her STEPHANIE is 2023, by Ultrasound. Current GA is 6w2d.     Initial positive test date : 2023, UPT        Her periods are: every 4 weeks  Prior obstetric issues: none  Patient's past medical history is significant for: None.  Family history of genetic issues (includes FOB): Sickle cell trait, Maternal Metabolic disorder  Prior infections concerning in pregnancy (Rash, fever in last 2 weeks): No  Varicella Hx - history of chicken pox  Prior testing for Cystic Fibrosis Carrier or Sickle Cell Trait- Not sure  Prepregnancy BMI - Body mass index is 36.04 kg/m².  History of STD: no  Hx of HSV for patient or partner: no  Ultrasound Today: yes    OB History    Para Term  AB Living   2 1 1 0 0 1   SAB IAB Ectopic Molar Multiple Live Births   0 0 0 0 0 1      # Outcome Date GA Lbr Jay/2nd Weight Sex Delivery Anes PTL Lv   2 Current            1 Term 08/10/22 39w6d 13:21 / 01:19 2850 g (6 lb 4.5 oz) F Vag-Spont EPI N LEXY       Additional Pertinent History   Last Pap : N/A Result: N/A      Last Completed Pap Smear     This patient has no relevant Health Maintenance data.        History of abnormal Pap smear: N/A  Family history of uterine, colon, breast, or ovarian cancer: yes - MGGM had breast cancer  Feelings of Anxiety or Depression: yes - Depression and Anxiety would like to go back on Zoloft  Tobacco Usage?: No Does Vape daily  Alcohol/Drug Use?: NO  Over the age of 35 at delivery: no  Desires Genetic Screening: Cell Free DNA       PMH    Current Outpatient Medications:   •  albuterol sulfate  (90 Base) MCG/ACT inhaler, Inhale 2 puffs Every 4 (Four) Hours As Needed for Wheezing or Shortness of Air. (Patient not taking: Reported on 2023), Disp: 1 inhaler,  Rfl: 0  •  ferrous sulfate 325 (65 FE) MG tablet, Take 1 tablet by mouth Daily With Breakfast. (Patient not taking: Reported on 4/17/2023), Disp: 60 tablet, Rfl: 1  •  ibuprofen (ADVIL,MOTRIN) 600 MG tablet, Take 1 tablet by mouth Every 6 (Six) Hours. (Patient not taking: Reported on 4/17/2023), Disp: 30 tablet, Rfl: 0  •  Prenatal Vit-Fe Fumarate-FA (Prenatal 27-1) 27-1 MG tablet tablet, Take 1 tablet by mouth Daily. (Patient not taking: Reported on 4/17/2023), Disp: , Rfl:   •  promethazine (PHENERGAN) 25 MG tablet, Take 1 tablet by mouth Every 6 (Six) Hours As Needed for Nausea or Vomiting., Disp: 30 tablet, Rfl: 0  •  sertraline (ZOLOFT) 50 MG tablet, Take 1 tablet by mouth Daily. (Patient not taking: Reported on 4/17/2023), Disp: 30 tablet, Rfl: 1  •  sertraline (Zoloft) 50 MG tablet, Take 1 tablet by mouth Daily., Disp: 30 tablet, Rfl: 2     Past Medical History:   Diagnosis Date   • Anxiety    • Asthma    • Depression    • Ovarian cyst     Was about 2 years ago   • Tonsil, abscess         Past Surgical History:   Procedure Laterality Date   • TONSILLECTOMY  2017    due to abscess   • WISDOM TOOTH EXTRACTION         Review of Systems   Review of Systems  Patient Reports: Nausea and cramping  Patient Denies: Nausea and vomiting, Spotting, Heavy bleeding and Fatigue  All systems reviewed and otherwise normal.    I have reviewed and agree with the HPI, ROS, and historical information as entered above. Itzel Alvarez MD    /58   Wt 108 kg (237 lb)   LMP 02/13/2023   BMI 36.04 kg/m²     The additional following portions of the patient's history were reviewed and updated as appropriate: allergies, current medications, past family history, past medical history, past social history, past surgical history and problem list.    Physical Exam  General:  well developed; well nourished  no acute distress  obese - Body mass index is 36.04 kg/m².   Chest/Respiratory: No labored breathing, normal respiratory  effort, normal appearance, no respiratory noises noted   Heart:  normal rate, regular rhythm,  no murmurs, rubs, or gallops   Thyroid: normal to inspection and palpation   Breasts:  Not performed.   Abdomen: soft, non-tender; no masses  no umbilical or inguinal hernias are present  no hepato-splenomegaly   Pelvis: Not performed.        Assessment and Plan    Problem List Items Addressed This Visit     Depression    Relevant Medications    sertraline (ZOLOFT) 50 MG tablet    sertraline (Zoloft) 50 MG tablet    Pregnancy    Overview     Desires IOL about 40wks. Needs to be scheduled 8/9pm         Multigravida in first trimester - Primary    Relevant Orders    Obstetric Panel    HIV-1 / O / 2 Ag / Antibody 4th Generation    Urine Culture - Urine, Urine, Clean Catch    Chlamydia trachomatis, Neisseria gonorrhoeae, PCR - Urine, Urine, Random Void    Urine Drug Screen - Urine, Clean Catch    US Ob < 14 Weeks Single or First Gestation    Obesity (BMI 30-39.9)    RESOLVED: BMI 32.0-32.9,adult       1. Pregnancy at 6w2d  2. Reviewed routine prenatal care with the office and educational materials given  3. Lab(s) Ordered  4. Discussed options for genetic testing including first trimester nuchal translucency screen, genetic disease carrier testing, quadruple screen, and NIPT  5. Medication(s) Ordered  6. Discontinue the use of all non-medicinal drugs and chemicals  7. Nausea/Vomiting - desires medication.  Options discussed and encouraged to be proactive to avoid constipation if on Zofran.  8. Patient is on Prenatal vitamins  9. Activity recommendation : 150 minutes/week of moderate intensity aerobic activity unless we limit for bleeding, hypertension or other pregnancy complication   Return in about 4 weeks (around 5/15/2023) for WITH LONG.      Itzel Alvarez MD  04/17/2023

## 2023-04-19 LAB
ABO GROUP BLD: ABNORMAL
AMPHETAMINES UR QL SCN: NEGATIVE NG/ML
BACTERIA UR CULT: NORMAL
BACTERIA UR CULT: NORMAL
BARBITURATES UR QL SCN: NEGATIVE NG/ML
BASOPHILS # BLD AUTO: 0.1 X10E3/UL (ref 0–0.2)
BASOPHILS NFR BLD AUTO: 1 %
BENZODIAZ UR QL SCN: NEGATIVE NG/ML
BLD GP AB SCN SERPL QL: NEGATIVE
BZE UR QL SCN: NEGATIVE NG/ML
C TRACH RRNA SPEC QL NAA+PROBE: NEGATIVE
CANNABINOIDS UR QL SCN: NEGATIVE NG/ML
CREAT UR-MCNC: 209.6 MG/DL (ref 20–300)
EOSINOPHIL # BLD AUTO: 0.1 X10E3/UL (ref 0–0.4)
EOSINOPHIL NFR BLD AUTO: 1 %
ERYTHROCYTE [DISTWIDTH] IN BLOOD BY AUTOMATED COUNT: 14 % (ref 11.7–15.4)
HBV SURFACE AG SERPL QL IA: NEGATIVE
HCT VFR BLD AUTO: 38.2 % (ref 34–46.6)
HCV IGG SERPL QL IA: NON REACTIVE
HGB BLD-MCNC: 12.3 G/DL (ref 11.1–15.9)
HIV 1+2 AB+HIV1 P24 AG SERPL QL IA: NON REACTIVE
IMM GRANULOCYTES # BLD AUTO: 0 X10E3/UL (ref 0–0.1)
IMM GRANULOCYTES NFR BLD AUTO: 0 %
LABORATORY COMMENT REPORT: NORMAL
LYMPHOCYTES # BLD AUTO: 2.1 X10E3/UL (ref 0.7–3.1)
LYMPHOCYTES NFR BLD AUTO: 24 %
MCH RBC QN AUTO: 26.5 PG (ref 26.6–33)
MCHC RBC AUTO-ENTMCNC: 32.2 G/DL (ref 31.5–35.7)
MCV RBC AUTO: 82 FL (ref 79–97)
METHADONE UR QL SCN: NEGATIVE NG/ML
MONOCYTES # BLD AUTO: 0.7 X10E3/UL (ref 0.1–0.9)
MONOCYTES NFR BLD AUTO: 8 %
N GONORRHOEA RRNA SPEC QL NAA+PROBE: NEGATIVE
NEUTROPHILS # BLD AUTO: 6 X10E3/UL (ref 1.4–7)
NEUTROPHILS NFR BLD AUTO: 66 %
OPIATES UR QL SCN: NEGATIVE NG/ML
OXYCODONE+OXYMORPHONE UR QL SCN: NEGATIVE NG/ML
PCP UR QL: NEGATIVE NG/ML
PH UR: 6.4 [PH] (ref 4.5–8.9)
PLATELET # BLD AUTO: 298 X10E3/UL (ref 150–450)
PROPOXYPH UR QL SCN: NEGATIVE NG/ML
RBC # BLD AUTO: 4.64 X10E6/UL (ref 3.77–5.28)
RH BLD: POSITIVE
RPR SER QL: NON REACTIVE
RUBV IGG SERPL IA-ACNC: <0.9 INDEX
WBC # BLD AUTO: 9 X10E3/UL (ref 3.4–10.8)

## 2023-05-09 ENCOUNTER — TELEPHONE (OUTPATIENT)
Dept: OBSTETRICS AND GYNECOLOGY | Facility: CLINIC | Age: 23
End: 2023-05-09
Payer: MEDICAID

## 2023-05-09 DIAGNOSIS — L29.2 VULVAR ITCHING: Primary | ICD-10-CM

## 2023-05-09 NOTE — TELEPHONE ENCOUNTER
Provider: DR. ZALDIVAR  Caller: HCRIS RODRÍGUEZ  Relationship to Patient: SELF  Pharmacy: [facility] [place] [address]  Phone Number: [phone]  Reason for Call: PT IS HAVING VAGINAL SWELLING, YELLOW DISCHARGE, WARM AND ITCHING FOR 3 DAYS  When was the patient last seen: 4-17-23  When did it start: 3 DAYS AGO  Where is it located:   Characteristics of symptom/severity: SEVERE.SWOLLEN  Timing- Is it constant or intermittent: CONSTANT  What makes it worse:   What makes it better:   What therapies/medications have you tried: NO

## 2023-05-09 NOTE — TELEPHONE ENCOUNTER
Sexually active. Vaginal d/c- yellowish (small amount, thick), vulvar swelling & itching x 3 days. No odor. Has not tried anything to improve symptoms. Still in 1st trimester. Unsure what she can tk. Patient declined hx of STD and concern for STD. STD results 4/17/2023 Sandip/Chlamydia/HIV/RPR/HepC&B all Neg. Terazol cream prescribed. Patient advised to call for appt if symptoms worsen or do not improve. PT VU.

## 2023-05-16 ENCOUNTER — ROUTINE PRENATAL (OUTPATIENT)
Dept: OBSTETRICS AND GYNECOLOGY | Facility: CLINIC | Age: 23
End: 2023-05-16
Payer: MEDICAID

## 2023-05-16 VITALS — DIASTOLIC BLOOD PRESSURE: 60 MMHG | WEIGHT: 238 LBS | SYSTOLIC BLOOD PRESSURE: 120 MMHG | BODY MASS INDEX: 36.2 KG/M2

## 2023-05-16 DIAGNOSIS — Z34.81 PRENATAL CARE, SUBSEQUENT PREGNANCY, FIRST TRIMESTER: Primary | ICD-10-CM

## 2023-05-16 DIAGNOSIS — Z34.81 MULTIGRAVIDA IN FIRST TRIMESTER: ICD-10-CM

## 2023-05-16 DIAGNOSIS — E66.9 OBESITY (BMI 30-39.9): ICD-10-CM

## 2023-05-16 LAB
GLUCOSE UR STRIP-MCNC: NEGATIVE MG/DL
PROT UR STRIP-MCNC: NEGATIVE MG/DL

## 2023-05-16 NOTE — PROGRESS NOTES
OB FOLLOW UP  CC- Here for care of pregnancy        Jenny Brunson is a 22 y.o.  10w3d patient being seen today for her obstetrical follow up visit. Patient reports nausea-tried phenergan but makes her sleepy, c/o occasional mild cramping. Patient is using terazol for yeast-c/o continued itching and swelling.      Her prenatal care is complicated by (and status) : Anxiety/Depression  Patient Active Problem List   Diagnosis   • Depression   • Pregnancy   • Multigravida in first trimester   • Obesity (BMI 30-39.9)   • Prenatal care, subsequent pregnancy, first trimester       Desires genetic testing?: Yes with Gender  Ultrasound Today: Yes    ROS -   Patient Reports : Cramping and Nausea  Patient Denies: Vaginal Spotting and Vomiting   Fetal Movement : NA  All other systems reviewed and are negative.     The additional following portions of the patient's history were reviewed and updated as appropriate: allergies and current medications.    I have reviewed and agree with the HPI, ROS, and historical information as entered above. Itzel Alvarez MD    /60   Wt 108 kg (238 lb)   LMP 2023   BMI 36.20 kg/m²         EXAM:     Prenatal Vitals  BP: 120/60  Weight: 108 kg (238 lb)   Fetal Heart Rate: pos          Urine Glucose Read-only: Negative  Urine Protein Read-only: Negative       Assessment and Plan    Problem List Items Addressed This Visit     Multigravida in first trimester    Obesity (BMI 30-39.9)    Prenatal care, subsequent pregnancy, first trimester - Primary    Relevant Orders    [id number] PLUS Core+SCA+ESS - Blood,    POC Urinalysis Dipstick (Completed)       1. Pregnancy at 10w3d  2. Labs reviewed from New OB Visit.  3. Counseled on genetic testing, carrier status and option for NT screen  4. Activity and Exercise discussed.  5. Lab(s) Ordered  6. Patient is on Prenatal vitamins  Return in about 4 weeks (around 2023) for Next scheduled follow up.    Itzel QUEEN  MD Antonio  05/16/2023

## 2023-05-22 ENCOUNTER — TELEPHONE (OUTPATIENT)
Dept: OBSTETRICS AND GYNECOLOGY | Facility: CLINIC | Age: 23
End: 2023-05-22
Payer: MEDICAID

## 2023-06-13 PROBLEM — Z34.92 SECOND TRIMESTER PREGNANCY: Status: ACTIVE | Noted: 2023-06-13

## 2023-08-23 ENCOUNTER — ROUTINE PRENATAL (OUTPATIENT)
Dept: OBSTETRICS AND GYNECOLOGY | Facility: CLINIC | Age: 23
End: 2023-08-23
Payer: MEDICAID

## 2023-08-23 VITALS — SYSTOLIC BLOOD PRESSURE: 116 MMHG | WEIGHT: 219.2 LBS | DIASTOLIC BLOOD PRESSURE: 62 MMHG | BODY MASS INDEX: 33.33 KG/M2

## 2023-08-23 DIAGNOSIS — E66.9 OBESITY (BMI 30-39.9): ICD-10-CM

## 2023-08-23 DIAGNOSIS — Z34.82 MULTIGRAVIDA IN SECOND TRIMESTER: Primary | ICD-10-CM

## 2023-08-23 LAB
GLUCOSE UR STRIP-MCNC: NEGATIVE MG/DL
PROT UR STRIP-MCNC: NEGATIVE MG/DL

## 2023-08-23 NOTE — PROGRESS NOTES
OB FOLLOW UP  CC- Here for care of pregnancy        Jenny Brunson is a 23 y.o.  24w4d patient being seen today for her obstetrical follow up visit. Patient reports no complaints.    Her prenatal care is complicated by (and status) :   Patient Active Problem List   Diagnosis    Depression    Pregnancy    Multigravida in first trimester    Obesity (BMI 30-39.9)    Prenatal care, subsequent pregnancy, first trimester    Second trimester pregnancy    Multigravida in second trimester          Ultrasound Today: No    ROS -   Patient Reports : No Problems  Patient Denies: Loss of Fluid, Vaginal Spotting, Vision Changes, Headaches, Nausea , Vomiting , Contractions, and Epigastric pain  Fetal Movement : normal  All other systems reviewed and are negative.       The additional following portions of the patient's history were reviewed and updated as appropriate: allergies, current medications, past family history, past medical history, past social history, past surgical history, and problem list.      I have reviewed and agree with the HPI, ROS, and historical information as entered above. Itzel Alvarez MD      /62   Wt 99.4 kg (219 lb 3.2 oz)   LMP 2023   BMI 33.33 kg/mý       EXAM:     Prenatal Vitals  BP: 116/62  Weight: 99.4 kg (219 lb 3.2 oz)                   Urine Glucose Read-only: Negative  Urine Protein Read-only: Negative       Assessment and Plan    Problem List Items Addressed This Visit       Obesity (BMI 30-39.9)    Multigravida in second trimester - Primary    Relevant Orders    POC Urinalysis Dipstick (Completed)    US Ob 14 + Weeks Single or First Gestation       Pregnancy at 24w4d  Fetal status reassuring.  U/s indicated today but f/u anatomy not scheduled b/c she was not seen by provider when she came for anatomy scan.  Will do in 2wk with PN2 labs.  1 hour gtt, CBC, Antibody screen, and TDAP next visit. Instructions given  U/S ordered at follow  up  Discussed/encouraged TDAP vaccination after 28 weeks  Activity and Exercise discussed.  Return in about 2 weeks (around 9/6/2023) for WITH SONO for followup anatomy, 28wk labs.    Itzel Alvarez MD  08/23/2023

## 2023-09-07 ENCOUNTER — ROUTINE PRENATAL (OUTPATIENT)
Dept: OBSTETRICS AND GYNECOLOGY | Facility: CLINIC | Age: 23
End: 2023-09-07
Payer: MEDICAID

## 2023-09-07 VITALS — SYSTOLIC BLOOD PRESSURE: 118 MMHG | BODY MASS INDEX: 33.42 KG/M2 | DIASTOLIC BLOOD PRESSURE: 70 MMHG | WEIGHT: 219.8 LBS

## 2023-09-07 DIAGNOSIS — Z34.93 PRENATAL CARE IN THIRD TRIMESTER: Primary | ICD-10-CM

## 2023-09-07 NOTE — PROGRESS NOTES
OB FOLLOW UP  CC- Here for care of pregnancy        Jenny Brunson is a 23 y.o.  26w5d patient being seen today for her obstetrical follow up. Patient reports occasional, mild headaches and swelling in her BLE.  C/o increased breast tenderness.  Has began to collect colostrum as it is leaking.     She denies LOF, vaginal spotting, vision changes or ade davey/contractions.  She reports adequate fetal movements, >10 movements in 10 hours.      Patient undergoing Glucola testing today. She is due for her testing at 11:30.       MBT: A+  Rhogam:  N/A  28 week packet: reviewed with patient , counseled on fetal movement , pediatrician list reviewed, breast pump discussed, and childbirth classes reviewed  TDAP: declines for today-hasn't decided  Ultrasound Today: Yes. Anatomy complete and appears normal.     Her prenatal care is complicated by (and status) :  None  Patient Active Problem List   Diagnosis    Depression    Pregnancy    Multigravida in first trimester    Obesity (BMI 30-39.9)    Prenatal care, subsequent pregnancy, first trimester    Second trimester pregnancy    Multigravida in second trimester         ROS -   Patient Reports :  breast tenderness, headaches and swelling  Patient Denies: Loss of Fluid, Vaginal Spotting, Vision Changes, Nausea , Vomiting , Contractions, and Epigastric pain  Fetal Movement : normal    The additional following portions of the patient's history were reviewed and updated as appropriate: allergies, current medications, past family history, past medical history, past social history, past surgical history, and problem list.    I have reviewed and agree with the HPI, ROS, and historical information as entered above. FRANCISCO JAVIER Hickey      /70   Wt 99.7 kg (219 lb 12.8 oz)   LMP 2023   BMI 33.42 kg/m²         EXAM:     Prenatal Vitals  BP: 118/70  Weight: 99.7 kg (219 lb 12.8 oz)   Fetal Heart Rate: 135                         Assessment and  Plan    Problem List Items Addressed This Visit    None  Visit Diagnoses       Prenatal care in third trimester    -  Primary    Relevant Orders    CBC (No Diff)    Gestational Screen 1 Hr ([facility])    Antibody Screen    POC Protein, Urine, Qualitative, Dipstick    POC Glucose, Urine, Qualitative, Dipstick            Pregnancy at 26w5d  1 hr Glucola, CBC, and antibody screen today  and TDAP declined, undecided, may get TDAP at next visit.   Fetal movement/PTL or Labor precautions  Discussed/encouraged TDAP vaccination after 28 weeks  Reviewed Pre-eclampsia signs/symptoms  Activity and Exercise discussed.  Return in about 2 weeks (around 9/21/2023) for Antonio SONG.    Becki Ty, FRANCISCO JAVIER  09/07/2023

## 2023-09-08 LAB
BLD GP AB SCN SERPL QL: NEGATIVE
ERYTHROCYTE [DISTWIDTH] IN BLOOD BY AUTOMATED COUNT: 13.3 % (ref 12.3–15.4)
GLUCOSE 1H P 50 G GLC PO SERPL-MCNC: 116 MG/DL (ref 65–139)
HCT VFR BLD AUTO: 35.1 % (ref 34–46.6)
HGB BLD-MCNC: 11.8 G/DL (ref 12–15.9)
MCH RBC QN AUTO: 27.1 PG (ref 26.6–33)
MCHC RBC AUTO-ENTMCNC: 33.6 G/DL (ref 31.5–35.7)
MCV RBC AUTO: 80.7 FL (ref 79–97)
PLATELET # BLD AUTO: 202 10*3/MM3 (ref 140–450)
RBC # BLD AUTO: 4.35 10*6/MM3 (ref 3.77–5.28)
WBC # BLD AUTO: 9.99 10*3/MM3 (ref 3.4–10.8)

## 2023-09-25 ENCOUNTER — ROUTINE PRENATAL (OUTPATIENT)
Dept: OBSTETRICS AND GYNECOLOGY | Facility: CLINIC | Age: 23
End: 2023-09-25
Payer: MEDICAID

## 2023-09-25 VITALS — SYSTOLIC BLOOD PRESSURE: 118 MMHG | BODY MASS INDEX: 33.75 KG/M2 | DIASTOLIC BLOOD PRESSURE: 72 MMHG | WEIGHT: 222 LBS

## 2023-09-25 DIAGNOSIS — A05.9 FOOD POISONING: ICD-10-CM

## 2023-09-25 DIAGNOSIS — E66.9 OBESITY (BMI 30-39.9): ICD-10-CM

## 2023-09-25 DIAGNOSIS — J06.9 ACUTE URI: ICD-10-CM

## 2023-09-25 DIAGNOSIS — Z34.92 SECOND TRIMESTER PREGNANCY: Primary | ICD-10-CM

## 2023-09-25 LAB
GLUCOSE UR STRIP-MCNC: NEGATIVE MG/DL
PROT UR STRIP-MCNC: NEGATIVE MG/DL

## 2023-09-25 RX ORDER — AZITHROMYCIN 250 MG/1
TABLET, FILM COATED ORAL
Qty: 6 TABLET | Refills: 0 | Status: SHIPPED | OUTPATIENT
Start: 2023-09-25

## 2023-09-25 NOTE — PROGRESS NOTES
OB FOLLOW UP  CC- Here for care of pregnancy        Jenny Brunson is a 23 y.o.  29w2d patient being seen today for her obstetrical follow up. Patient reports congestion, strep, COVID and Flu all negative. Will get TDAP at next visit when she feels better.      Patients Glucola was normal      MBT: A+  Rhogam:  not indicated      Flu Status: Desires at future appt  Ultrasound Today: No    Her prenatal care is complicated by (and status) :  None  Patient Active Problem List   Diagnosis    Depression    Pregnancy    Multigravida in first trimester    Obesity (BMI 30-39.9)    Prenatal care, subsequent pregnancy, first trimester    Second trimester pregnancy    Multigravida in second trimester    Food poisoning    Acute URI         ROS -   Patient Reports : No Problems  Patient Denies: Loss of Fluid, Vaginal Spotting, Vision Changes, Headaches, Nausea , Vomiting , Contractions, and Epigastric pain  Fetal Movement : normal    The additional following portions of the patient's history were reviewed and updated as appropriate: allergies, current medications, past family history, past medical history, past social history, past surgical history, and problem list.    I have reviewed and agree with the HPI, ROS, and historical information as entered above. Itzel Alvarez MD      /72   Wt 101 kg (222 lb)   LMP 2023   BMI 33.75 kg/m²         EXAM:     Prenatal Vitals  BP: 118/72  Weight: 101 kg (222 lb)   Fetal Heart Rate: pos      Fundal Height (cm): 31 cm        Urine Glucose Read-only: Negative  Urine Protein Read-only: Negative         Assessment and Plan    Problem List Items Addressed This Visit       Obesity (BMI 30-39.9)    Second trimester pregnancy - Primary    Relevant Orders    POC Urinalysis Dipstick (Completed)    Food poisoning    Acute URI    Relevant Medications    azithromycin (Zithromax Z-Mikey) 250 MG tablet       Pregnancy at 29w2d  1 hr Glucola, CBC, and antibody screen  today  and TDAP given today  Fetal movement/PTL or Labor precautions  Medication(s) Ordered  Discussed/encouraged TDAP vaccination after 28 weeks  Activity and Exercise discussed.  Encouraged hydration.  Zpak for sinusitis.  Return in about 3 weeks (around 10/16/2023) for Next scheduled follow up.    Itzel Alvarez MD  09/25/2023

## 2023-10-17 ENCOUNTER — ROUTINE PRENATAL (OUTPATIENT)
Dept: OBSTETRICS AND GYNECOLOGY | Facility: CLINIC | Age: 23
End: 2023-10-17
Payer: MEDICAID

## 2023-10-17 VITALS — SYSTOLIC BLOOD PRESSURE: 114 MMHG | BODY MASS INDEX: 33.63 KG/M2 | WEIGHT: 221.2 LBS | DIASTOLIC BLOOD PRESSURE: 72 MMHG

## 2023-10-17 DIAGNOSIS — O26.849 UTERINE SIZE DATE DISCREPANCY PREGNANCY: ICD-10-CM

## 2023-10-17 DIAGNOSIS — R39.9 UTI SYMPTOMS: ICD-10-CM

## 2023-10-17 DIAGNOSIS — Z34.83 MULTIGRAVIDA IN THIRD TRIMESTER: Primary | ICD-10-CM

## 2023-10-17 LAB
BILIRUB BLD-MCNC: NEGATIVE MG/DL
CLARITY, POC: CLEAR
COLOR UR: ABNORMAL
GLUCOSE UR STRIP-MCNC: NEGATIVE MG/DL
GLUCOSE UR STRIP-MCNC: NEGATIVE MG/DL
KETONES UR QL: NEGATIVE
LEUKOCYTE EST, POC: NEGATIVE
NITRITE UR-MCNC: NEGATIVE MG/ML
PH UR: 6 [PH] (ref 5–8)
PROT UR STRIP-MCNC: ABNORMAL MG/DL
PROT UR STRIP-MCNC: ABNORMAL MG/DL
RBC # UR STRIP: NEGATIVE /UL
SP GR UR: 1.02 (ref 1–1.03)
UROBILINOGEN UR QL: NORMAL

## 2023-10-17 NOTE — PROGRESS NOTES
OB FOLLOW UP  CC- Here for care of pregnancy        Jenny Brunson is a 23 y.o.  32w3d patient being seen today for her obstetrical follow up visit. Started urinary urgency and frequency 2 days ago, but symptoms have resolved as of today. Has not been drinking much water at all.     Her prenatal care is complicated by (and status) :  None  Patient Active Problem List   Diagnosis    Depression    Pregnancy    Multigravida in first trimester    Obesity (BMI 30-39.9)    Prenatal care, subsequent pregnancy, first trimester    Second trimester pregnancy    Multigravida in second trimester    Food poisoning    Acute URI       Flu Status: Already given in current flu season  TDAP status: declines  Rhogam status: was not indicated  28 week labs: Reviewed  Ultrasound Today: No  Non Stress Test: No.      ROS -   Patient Reports :  Patient reports having UTI symptoms.  Patient Denies: Loss of Fluid, Vaginal Spotting, Vision Changes, Headaches, Nausea , Vomiting , Contractions, and Epigastric pain  Fetal Movement : normal  All other systems reviewed and are negative.       The additional following portions of the patient's history were reviewed and updated as appropriate: allergies, current medications, past family history, past medical history, past social history, past surgical history, and problem list.    I have reviewed and agree with the HPI, ROS, and historical information as entered above. Joanna Damon, APRN      /72   Wt 100 kg (221 lb 3.2 oz)   LMP 2023   BMI 33.63 kg/m²         EXAM:     Prenatal Vitals  BP: 114/72  Weight: 100 kg (221 lb 3.2 oz)   Fetal Heart Rate: 140           Urine Glucose Read-only: Negative  Urine Protein Read-only: (!) Trace       Assessment and Plan    Problem List Items Addressed This Visit    None  Visit Diagnoses       Multigravida in third trimester    -  Primary    Relevant Orders    POC Urinalysis Dipstick (Completed)    POC Urinalysis Dipstick  (Completed)    US Ob Follow Up Transabdominal Approach    UTI symptoms        Relevant Orders    POC Urinalysis Dipstick (Completed)    Uterine size date discrepancy pregnancy        Relevant Orders    US Ob Follow Up Transabdominal Approach            Pregnancy at 32w3d  Fetal status reassuring.  CCUA Neg. Increase H20 intake to 80oz/day  28 week labs reviewed.    Activity and Exercise discussed.  Increase water intake to 80 oz/day, elevate bilateral lower extremities, compression stockings/socks, monitor sodium intake <1,500 mg/day.  Fetal movement/PTL or Labor precautions  Patient is on Prenatal vitamins  Discussed/encouraged TDAP vaccination after 28 weeks  Discussed/encouraged social distancing/COVID19 precautions; encouraged vaccination when able  Reviewed Pre-eclampsia signs/symptoms  Return in about 2 weeks (around 10/31/2023) for DUNCAN toscano/Antonio, Ultrasound- Growth S<D.    Joanna Damon, APRN  10/17/2023

## 2023-10-28 ENCOUNTER — HOSPITAL ENCOUNTER (OUTPATIENT)
Facility: HOSPITAL | Age: 23
Discharge: HOME | End: 2023-10-28
Attending: OBSTETRICS & GYNECOLOGY | Admitting: OBSTETRICS & GYNECOLOGY
Payer: MEDICAID

## 2023-10-28 VITALS
SYSTOLIC BLOOD PRESSURE: 104 MMHG | OXYGEN SATURATION: 98 % | HEART RATE: 97 BPM | RESPIRATION RATE: 16 BRPM | DIASTOLIC BLOOD PRESSURE: 70 MMHG | TEMPERATURE: 98.2 F

## 2023-10-28 PROBLEM — O47.03 FALSE LABOR BEFORE 37 COMPLETED WEEKS OF GESTATION DURING PREGNANCY IN THIRD TRIMESTER, ANTEPARTUM: Status: ACTIVE | Noted: 2023-10-28

## 2023-10-28 PROBLEM — F32.A DEPRESSION: Status: RESOLVED | Noted: 2022-06-26 | Resolved: 2023-10-28

## 2023-10-28 PROBLEM — Z34.81 MULTIGRAVIDA IN FIRST TRIMESTER: Status: RESOLVED | Noted: 2023-04-17 | Resolved: 2023-10-28

## 2023-10-28 PROBLEM — Z34.92 SECOND TRIMESTER PREGNANCY: Status: RESOLVED | Noted: 2023-06-13 | Resolved: 2023-10-28

## 2023-10-28 PROBLEM — E66.9 OBESITY (BMI 30-39.9): Status: RESOLVED | Noted: 2023-04-17 | Resolved: 2023-10-28

## 2023-10-28 PROBLEM — O47.03 FALSE LABOR BEFORE 37 COMPLETED WEEKS OF GESTATION DURING PREGNANCY IN THIRD TRIMESTER, ANTEPARTUM: Status: RESOLVED | Noted: 2023-10-28 | Resolved: 2023-10-28

## 2023-10-28 PROBLEM — Z34.82 MULTIGRAVIDA IN SECOND TRIMESTER: Status: RESOLVED | Noted: 2023-08-23 | Resolved: 2023-10-28

## 2023-10-28 PROBLEM — Z34.81 PRENATAL CARE, SUBSEQUENT PREGNANCY, FIRST TRIMESTER: Status: RESOLVED | Noted: 2023-05-16 | Resolved: 2023-10-28

## 2023-10-28 PROBLEM — J06.9 ACUTE URI: Status: RESOLVED | Noted: 2023-09-25 | Resolved: 2023-10-28

## 2023-10-28 PROCEDURE — 59025 FETAL NON-STRESS TEST: CPT | Performed by: OBSTETRICS & GYNECOLOGY

## 2023-10-28 PROCEDURE — G0463 HOSPITAL OUTPT CLINIC VISIT: HCPCS

## 2023-10-28 PROCEDURE — 59025 FETAL NON-STRESS TEST: CPT

## 2023-10-28 PROCEDURE — 99235 HOSP IP/OBS SAME DATE MOD 70: CPT | Performed by: OBSTETRICS & GYNECOLOGY

## 2023-10-28 NOTE — PLAN OF CARE
Problem: Adult Inpatient Plan of Care  Goal: Plan of Care Review  Outcome: Met  Flowsheets (Taken 10/28/2023 1636)  Progress: improving  Plan of Care Reviewed With: patient  Goal: Patient-Specific Goal (Individualized)  Outcome: Met  Goal: Absence of Hospital-Acquired Illness or Injury  Outcome: Met  Goal: Optimal Comfort and Wellbeing  Outcome: Met  Intervention: Monitor Pain and Promote Comfort  Description: Assess pain level, treatment efficacy and patient response at regular intervals using a consistent pain scale.  Consider the presence and impact of preexisting chronic pain.  Encourage patient and caregiver involvement in pain assessment, interventions and safety measures.  Recent Flowsheet Documentation  Taken 10/28/2023 1540 by Martine Fraire RN  Pain Management Interventions:   pillow support provided   position adjusted  Goal: Readiness for Transition of Care  Outcome: Met     Problem: Maternal-Fetal Wellbeing  Goal: Optimal Maternal-Fetal Wellbeing  Outcome: Met   Goal Outcome Evaluation:  Plan of Care Reviewed With: patient        Progress: improving

## 2023-10-28 NOTE — H&P
Alejandra  Jenny Brunson  : 2000  MRN: [id number]  CSN: [id number]    History and Physical    Subjective   Chief Complaint   Patient presents with    Pelvic Pain     Jenny Brunson is a 23 y.o. year old  currently at 34w0d presenting with a multitude of complaints including pelvic pressure, diarrhea times, low back pain and occasional contractions.  Denies leaking of fluid or bleeding.    Prenatal care has been with Dr. Alvarez.  It has been benign.    OB History    Para Term  AB Living   2 1 1 0 0 1   SAB IAB Ectopic Molar Multiple Live Births   0 0 0 0 0 1      # Outcome Date GA Lbr Jay/2nd Weight Sex Delivery Anes PTL Lv   2 Current            1 Term 08/10/22 39w6d 13:21 / :19 2850 g (6 lb 4.5 oz) F Vag-Spont EPI N LEXY      Name: Alyssa      Apgar1: 8  Apgar5: 9      Obstetric Comments   2022 - [facility]     Past Medical History:   Diagnosis Date    Childhood asthma 2007    Depression with anxiety      Past Surgical History:   Procedure Laterality Date    TONSILLECTOMY  2017    due to abscess    WISDOM TOOTH EXTRACTION       No current facility-administered medications for this encounter.    No Known Allergies  Social History    Tobacco Use      Smoking status: Every Day        Types: Electronic Cigarette, Cigarettes        Start date:       Smokeless tobacco: Current      Tobacco comments: vapes +nicotine    Review of Systems   Constitutional:  Negative for unexpected weight change.   Respiratory:  Negative for cough and shortness of breath.    Cardiovascular:  Negative for chest pain.   Gastrointestinal:  Positive for diarrhea. Negative for abdominal distention and constipation.        No persistent bloating   Genitourinary:  Negative for difficulty urinating, dysuria, hematuria and urgency.        No significant incontinence   Skin:  Negative for rash.   Neurological:  Negative for headaches.         Objective   /70   Pulse 97   Temp 98.2 °F (36.8  °C) (Oral)   Resp 16   LMP 2023   SpO2 98%   General: well developed; well nourished  no acute distress   Abdomen: soft, non-tender; no masses  no umbilical or inguinal hernias are present  no hepato-splenomegaly   FHT's: reassuring and category 1      Cervix: was checked (by me): 0 cm / 0 % / -3   Presentation: Not assessed   Contractions: none   Back: CVA tenderness is absent bilateral      Prenatal Labs  Lab Results   Component Value Date    HGB 11.8 (L) 2023    RUBELLAABIGG <0.90 (L) 2023    HEPBSAG Negative 2023    ABORH A Positive 01/10/2022    ABSCRN Negative 2023    DKT9OFT4 Non Reactive 2023    HEPCVIRUSABY Non Reactive 2023     2023    STREPGPB No Group B Streptococcus isolated 2022    URINECX Final report 2023    CHLAMNAA Negative 2023    NGONORRHON Negative 2023       Current Labs Reviewed   none       Assessment   IUP at 34w0d  False labor     Plan   Discharged home  Keep scheduled follow-up    Eh Hall MD  10/28/2023  16:16 EDT        Non Stress Test    [facility]  Patient: Jenny Brunson  : 2000  MRN: [id number]  CSN: [id number]  Date: 10/28/2023    Estimated Date of Delivery: 23  Gestational Age: 34w0d    Indication for NST Evaluation for labor       Total Time on NST > 20 minutes       Interpretation    Baseline  beats per minute   Category 1   Decelerations Absent       Additional Comments none       This note has been electronically signed.    Eh Hall MD  10/28/2023  16:18 EDT

## 2023-11-01 ENCOUNTER — ROUTINE PRENATAL (OUTPATIENT)
Dept: OBSTETRICS AND GYNECOLOGY | Facility: CLINIC | Age: 23
End: 2023-11-01
Payer: MEDICAID

## 2023-11-01 VITALS — SYSTOLIC BLOOD PRESSURE: 110 MMHG | DIASTOLIC BLOOD PRESSURE: 78 MMHG | BODY MASS INDEX: 33.6 KG/M2 | WEIGHT: 221 LBS

## 2023-11-01 DIAGNOSIS — R10.2 PELVIC CRAMPING: ICD-10-CM

## 2023-11-01 DIAGNOSIS — Z3A.34 34 WEEKS GESTATION OF PREGNANCY: Primary | ICD-10-CM

## 2023-11-01 DIAGNOSIS — R82.998 LEUKOCYTES IN URINE: ICD-10-CM

## 2023-11-01 DIAGNOSIS — R11.0 NAUSEA WITHOUT VOMITING: ICD-10-CM

## 2023-11-01 LAB
BILIRUB BLD-MCNC: NEGATIVE MG/DL
CLARITY, POC: CLEAR
COLOR UR: YELLOW
GLUCOSE UR STRIP-MCNC: NEGATIVE MG/DL
KETONES UR QL: NEGATIVE
LEUKOCYTE EST, POC: ABNORMAL
NITRITE UR-MCNC: NEGATIVE MG/ML
PH UR: 6.5 [PH] (ref 5–8)
PROT UR STRIP-MCNC: ABNORMAL MG/DL
RBC # UR STRIP: NEGATIVE /UL
SP GR UR: 1.02 (ref 1–1.03)
UROBILINOGEN UR QL: ABNORMAL

## 2023-11-01 RX ORDER — ONDANSETRON 4 MG/1
8 TABLET, FILM COATED ORAL EVERY 8 HOURS PRN
Qty: 30 TABLET | Refills: 1 | Status: SHIPPED | OUTPATIENT
Start: 2023-11-01

## 2023-11-01 NOTE — PROGRESS NOTES
OB FOLLOW UP  CC- Here for care of pregnancy        Jenny Brunson is a 23 y.o.  34w4d patient being seen today for her obstetrical follow up visit. Patient reports irregular contractions , swelling in both lower extremities that is Non-Pitting.  Headaches that are relieved by Tylenol or rest. Low back pain. Denies dysuria. Bilateral  lower quadrant pain and vaginal pressure/pain. She also reports nausea and not drinking as much fluids. Zofran ODT prescribed caused vomiting. Did not like the taste on the tongue.    Her prenatal care is complicated by (and status) :   Patient Active Problem List   Diagnosis    Pregnancy    Food poisoning       Flu Status: Already given in current flu season  Ultrasound Today: Yes. Findings showed REBEKAH 13.8cm, AC 12%, EFW 24%, , cephalic.   Non Stress Test: No.  TDAP: given today     ROS -   Patient Reports :  see above   Patient Denies: Vaginal Spotting, Vision Changes, Vomiting , and Epigastric pain  Fetal Movement : normal  All other systems reviewed and are negative.       The additional following portions of the patient's history were reviewed and updated as appropriate: allergies, current medications, past family history, past medical history, past social history, past surgical history, and problem list.    I have reviewed and agree with the HPI, ROS, and historical information as entered above. Joanna Damon, APRN      /78   Wt 100 kg (221 lb)   LMP 2023   BMI 33.60 kg/m²       EXAM:     Prenatal Vitals  BP: 110/78  Weight: 100 kg (221 lb)   Fetal Heart Rate: 133   Dilation/Effacement/Station  Dilation: Closed  Effacement (%): 40  Station: -3       Urine Glucose Read-only: Negative  Urine Protein Read-only: (!) Trace       Assessment and Plan    Problem List Items Addressed This Visit       Pregnancy - Primary    Overview     Desires IOL about 40wks. Needs to be scheduled 8/9pm  Anatomy complete and appears normal           Relevant  Orders    POC Urinalysis Dipstick (Completed)    Urine Culture - Urine, Urine, Clean Catch    Tdap Vaccine => 8yo IM (BOOSTRIX) (Completed)     Other Visit Diagnoses       Nausea without vomiting        Relevant Medications    ondansetron (Zofran) 4 MG tablet    Leukocytes in urine        Relevant Orders    POC Urinalysis Dipstick (Completed)    Urine Culture - Urine, Urine, Clean Catch    Pelvic cramping        Relevant Orders    POC Urinalysis Dipstick (Completed)            Pregnancy at 34w4d  Fetal status reassuring.   CCUA +leuks, +protein. Urine sent for culture. Offered to begin treatment for UTI. She would like to wait for culture results. Advised to increase H20 intake to 80oz/day.   Regular Zofran prescribed for nausea.   Activity and Exercise discussed.  Fetal movement/PTL or Labor precautions  Patient is on Prenatal vitamins  Discussed/encouraged social distancing/COVID19 precautions; encouraged vaccination when able  Reviewed Pre-eclampsia signs/symptoms  GBS next visit  Increase water intake, elevate bilateral lower extremities, compression stockings/socks, monitor sodium intake <1,500 mg/day.  Patient advised to begin wearing a belly band for round ligament pains and vaginal pressure/pain.   Return in about 2 weeks (around 11/15/2023) for Antonio Viveros.    Joanna Damon, APRN  11/01/2023

## 2023-11-03 LAB
BACTERIA UR CULT: NORMAL
BACTERIA UR CULT: NORMAL

## 2023-11-15 ENCOUNTER — ROUTINE PRENATAL (OUTPATIENT)
Dept: OBSTETRICS AND GYNECOLOGY | Facility: CLINIC | Age: 23
End: 2023-11-15
Payer: MEDICAID

## 2023-11-15 VITALS — BODY MASS INDEX: 33.69 KG/M2 | WEIGHT: 221.6 LBS | DIASTOLIC BLOOD PRESSURE: 62 MMHG | SYSTOLIC BLOOD PRESSURE: 110 MMHG

## 2023-11-15 DIAGNOSIS — Z34.90 PREGNANCY, UNSPECIFIED GESTATIONAL AGE: Primary | ICD-10-CM

## 2023-11-15 LAB
GLUCOSE UR STRIP-MCNC: NEGATIVE MG/DL
PROT UR STRIP-MCNC: NEGATIVE MG/DL

## 2023-11-15 NOTE — PROGRESS NOTES
OB FOLLOW UP  CC- Here for care of pregnancy        Jenny Brunson is a 23 y.o.  36w4d patient being seen today for her obstetrical follow up visit. Patient reports increased nausea and heartburn.  Pt also reports occasional cramping and headaches relieved with Tylenol.     Her prenatal care is complicated by (and status) :   Patient Active Problem List   Diagnosis    Pregnancy    Food poisoning       GBS Status: Pt refuses to do today. Wants to do at next appt.     No Known Allergies       Flu Status: Already given in current flu season  Her Delivery Plan is: Does not desire IOL  unless medically indicated  US today: no  Non Stress Test: No.    ROS -   Patient Denies: Loss of Fluid, Vaginal Spotting, Vision Changes, Headaches, Vomiting , and Epigastric pain  Fetal Movement : normal  All other systems reviewed and are negative.       The additional following portions of the patient's history were reviewed and updated as appropriate: allergies, current medications, past family history, past medical history, past social history, past surgical history, and problem list.    I have reviewed and agree with the HPI, ROS, and historical information as entered above. FRANCISCO JAVIER Hickey        EXAM:     Prenatal Vitals  BP: 110/62  Weight: 101 kg (221 lb 9.6 oz)   Fetal Heart Rate: 147   Fundal Height (cm): 36 cm          Urine Glucose Read-only: Negative  Urine Protein Read-only: Negative           Assessment and Plan    Problem List Items Addressed This Visit       Pregnancy - Primary    Overview     Desires IOL about 40wks. Needs to be scheduled 8/9pm  Anatomy complete and appears normal           Relevant Orders    POC Urinalysis Dipstick (Completed)       Pregnancy at 36w4d  Fetal status reassuring.   Discussed GBS testing- we like to do around 36 wks so we know if they're positive or negative if she were to go into labor early. She refuses to do today, states she is tired and hungry and usually doesn't  "schedule \"morning appointments\" so will not be doing it today. She states she will do it next visit.   Reviewed Pre-eclampsia signs/symptoms  Discussed options for IOL. Patient desires spontaneous labor. Would like to postpone an IOL unless medically indicated.   Delivery options reviewed with patient  Signs of labor reviewed  Kick counts reviewed  Activity and Exercise discussed.  Return in about 1 week (around 11/22/2023) for Antonio SONG.    Becki Ty, APRN  11/15/2023   "

## 2023-11-23 ENCOUNTER — HOSPITAL ENCOUNTER (OUTPATIENT)
Facility: HOSPITAL | Age: 23
Setting detail: OBSERVATION
Discharge: HOME | End: 2023-11-23
Attending: OBSTETRICS & GYNECOLOGY | Admitting: OBSTETRICS & GYNECOLOGY
Payer: MEDICAID

## 2023-11-23 VITALS
SYSTOLIC BLOOD PRESSURE: 124 MMHG | HEIGHT: 68 IN | TEMPERATURE: 98.3 F | DIASTOLIC BLOOD PRESSURE: 75 MMHG | RESPIRATION RATE: 16 BRPM | BODY MASS INDEX: 33.49 KG/M2 | HEART RATE: 86 BPM | WEIGHT: 221 LBS

## 2023-11-23 PROBLEM — Z34.93 THIRD TRIMESTER PREGNANCY: Status: ACTIVE | Noted: 2023-11-23

## 2023-11-23 LAB — POC AMNISURE: NEGATIVE

## 2023-11-23 PROCEDURE — 84112 EVAL AMNIOTIC FLUID PROTEIN: CPT | Performed by: OBSTETRICS & GYNECOLOGY

## 2023-11-23 PROCEDURE — G0378 HOSPITAL OBSERVATION PER HR: HCPCS

## 2023-11-23 PROCEDURE — G0463 HOSPITAL OUTPT CLINIC VISIT: HCPCS

## 2023-11-23 PROCEDURE — 59025 FETAL NON-STRESS TEST: CPT

## 2023-11-23 PROCEDURE — 87081 CULTURE SCREEN ONLY: CPT | Performed by: OBSTETRICS & GYNECOLOGY

## 2023-11-23 PROCEDURE — 99213 OFFICE O/P EST LOW 20 MIN: CPT | Performed by: OBSTETRICS & GYNECOLOGY

## 2023-11-23 PROCEDURE — 59025 FETAL NON-STRESS TEST: CPT | Performed by: OBSTETRICS & GYNECOLOGY

## 2023-11-23 NOTE — H&P
"[facility]  Obstetric History and Physical    Referring Provider: Itzel Alvarez*      CC: Decreased fetal movement    Subjective     Patient is a 23 y.o. female  currently at 37w5d, who presents with complaint of decreased fetal movement and possible ruptured membranes.  Patient reports decreased fetal movement today however has been up intermittently throughout the night with a sick toddler.  Patient also reports gush of clear fluid without vaginal bleeding or increased uterine activity.   course uncomplicated to date.  After arrival to labor delivery patient reports normal fetal activity.    .    The following portions of the patients history were reviewed and updated as appropriate: current medications, allergies, past medical history, past surgical history, past family history, past social history, and problem list .       Prenatal Information:   Maternal Prenatal Labs  Blood Type No results found for: \"ABO\"   Rh Status No results found for: \"RH\"   Antibody Screen No results found for: \"ABSCRN\"   Gonnorhea No results found for: \"GCCX\"   Chlamydia No results found for: \"CLAMYDCU\"   RPR No results found for: \"RPR\"   Syphilis Antibody No results found for: \"SYPHILIS\"   Rubella No results found for: \"RUBELLAIGGIN\"   Hepatitis B Surface Antigen No results found for: \"HEPBSAG\"   HIV-1 Antibody No results found for: \"LABHIV1\"   Hepatitis C Antibody No results found for: \"HEPCAB\"   Rapid Urin Drug Screen No results found for: \"AMPMETHU\", \"BARBITSCNUR\", \"LABBENZSCN\", \"LABMETHSCN\", \"LABOPIASCN\", \"THCURSCR\", \"COCAINEUR\", \"AMPHETSCREEN\", \"PROPOXSCN\", \"BUPRENORSCNU\", \"METAMPSCNUR\", \"OXYCODONESCN\", \"TRICYCLICSCN\"   Group B Strep Culture No results found for: \"GBSANTIGEN\"           External Prenatal Results       Pregnancy Outside Results - Transcribed From Office Records - See Scanned Records For Details       Test Value Date Time    ABO  A  23    Rh  Positive  23    Antibody " Screen  Negative  23 1147       Negative  23 0926    Varicella IgG       Rubella  <0.90 index 23 09    Hgb  11.8 g/dL 23 1147       12.3 g/dL 23 09    Hct  35.1 % 23 1147       38.2 % 23 09    Glucose Fasting GTT       Glucose Tolerance Test 1 hour       Glucose Tolerance Test 3 hour       Gonorrhea (discrete)  Negative  23 09    Chlamydia (discrete)  Negative  23    RPR  Non Reactive  23 09    VDRL       Syphilis Antibody       HBsAg  Negative  23    Herpes Simplex Virus PCR       Herpes Simplex VIrus Culture       HIV  Non Reactive  23    Hep C RNA Quant PCR       Hep C Antibody  Non Reactive  23    AFP       Group B Strep  No Group B Streptococcus isolated  22 1642    GBS Susceptibility to Clindamycin       GBS Susceptibility to Erythromycin       Fetal Fibronectin       Genetic Testing, Maternal Blood                 Drug Screening       Test Value Date Time    Urine Drug Screen       Amphetamine Screen  Negative ng/mL 23 0926    Barbiturate Screen  Negative ng/mL 23 09    Benzodiazepine Screen  Negative ng/mL 23 0926    Methadone Screen  Negative ng/mL 23 0926    Phencyclidine Screen  Negative ng/mL 23 09    Opiates Screen       THC Screen  PRESUMPTIVE POSITIVE  16    Cocaine Screen       Propoxyphene Screen  Negative ng/mL 23 09    Buprenorphine Screen ^ <10 ng/mL 01/10/22 1500      ^ <50 ng/mL 01/10/22 1500    Methamphetamine Screen       Oxycodone Screen       Tricyclic Antidepressants Screen  Negative  16              Legend    ^: Historical                              Past OB History:       OB History    Para Term  AB Living   2 1 1 0 0 1   SAB IAB Ectopic Molar Multiple Live Births   0 0 0 0 0 1      # Outcome Date GA Lbr Jay/2nd Weight Sex Delivery Anes PTL Lv   2 Current            1 Term 08/10/22 39w6d 13:21 /  01:19 2850 g (6 lb 4.5 oz) F Vag-Spont EPI N LEXY      Name: Alyssa      Apgar1: 8  Apgar5: 9      Obstetric Comments   2022 - [facility]       Past Medical History: Past Medical History:   Diagnosis Date    Childhood asthma 2007    Depression with anxiety 2016      Past Surgical History Past Surgical History:   Procedure Laterality Date    TONSILLECTOMY  2017    due to abscess    WISDOM TOOTH EXTRACTION  2020      Family History: Family History   Problem Relation Age of Onset    Skin cancer Mother     Autism Brother     Bipolar disorder Brother     Psychosis Brother       Social History:  reports that she has been smoking electronic cigarette. She uses smokeless tobacco.   reports that she does not currently use alcohol.   reports no history of drug use.                   General ROS Negative Findings:Headaches, Visual Changes, Epigastric pain, Anorexia, Nausia/Vomiting, and Vaginal Bleeding    ROS     All other systems have been reviewed and are neg  Objective       Vital Signs Range for the last 24 hours  Temperature: Temp:  [98.3 °F (36.8 °C)] 98.3 °F (36.8 °C)   Temp Source: Temp src: Oral   BP: BP: (124)/(75) 124/75   Pulse: Heart Rate:  [86] 86   Respirations: Resp:  [16] 16   SPO2:     O2 Amount (l/min):     O2 Devices     Weight: Weight:  [100 kg (221 lb)] 100 kg (221 lb)     Physical Examination:   General:   alert, appears stated age, and cooperative   Skin:   normal   HEENT:     Lungs:      Heart:      Gastrointestinal: Abdomen soft, gravid uterus, guarding benign exam   Lower Extremities: No edema, no calf tenderness   : AmniSure negative.   Musculoskeletal:     Neuro: No focal deficits noted               Fetal Heart Rate Assessment   Method:     Beats/min:     Baseline:     Varibility:     Accels:     Decels:     Tracing Category:     NST-indication decreased fetal movement, interpretation reactive, moderate variability, accelerations present 15 x 15, no fetal durations noted, onset of 28, end time 1308, no  regular contractions noted.  Uterine Assessment   Method:     Frequency (min):     Ctx Count in 10 min:     Duration:     Intensity:     Intensity by IUPC:     Resting Tone:     Resting Tone by IUPC:     Atkins Units:       Laboratory Results:   Lab Results (last 24 hours)       ** No results found for the last 24 hours. **          Radiology Review:   Imaging Results (Last 24 Hours)       ** No results found for the last 24 hours. **          Other Studies: Bedside ultrasound single IUP vertex, fetus active, biophysical profile 8 out of 8, max vertical pocket 5 cm.    Assessment & Plan       Third trimester pregnancy        Assessment:  1.  Intrauterine pregnancy at 37w5d weeks gestation with reactive fetal status.    2.  Decreased fetal movement/resolved  3.  No evidence of ruptured membranes  4.      Plan:  1.  Discharged home, kick count, labor instruction given, GBS performed prior to discharge.  Follow-up OB provider routine or as needed  2. Plan of care has been reviewed with patient.  3.  Risks, benefits of treatment plan have been discussed.  4.  All questions have been answered.  5      Osvaldo Piper DO  11/23/2023  13:14 EST

## 2023-11-26 LAB — BACTERIA SPEC AEROBE CULT: NORMAL

## 2023-11-30 ENCOUNTER — ANESTHESIA (OUTPATIENT)
Dept: LABOR AND DELIVERY | Facility: HOSPITAL | Age: 23
End: 2023-11-30
Payer: MEDICAID

## 2023-11-30 ENCOUNTER — HOSPITAL ENCOUNTER (INPATIENT)
Facility: HOSPITAL | Age: 23
LOS: 2 days | Discharge: HOME | End: 2023-12-02
Attending: OBSTETRICS & GYNECOLOGY | Admitting: OBSTETRICS & GYNECOLOGY
Payer: MEDICAID

## 2023-11-30 ENCOUNTER — ANESTHESIA EVENT (OUTPATIENT)
Dept: LABOR AND DELIVERY | Facility: HOSPITAL | Age: 23
End: 2023-11-30
Payer: MEDICAID

## 2023-11-30 PROBLEM — Z37.9 NORMAL LABOR: Status: ACTIVE | Noted: 2023-11-30

## 2023-11-30 LAB
ABO GROUP BLD: NORMAL
ALP SERPL-CCNC: 116 U/L (ref 39–117)
ALT SERPL W P-5'-P-CCNC: 10 U/L (ref 1–33)
AST SERPL-CCNC: 12 U/L (ref 1–32)
BILIRUB SERPL-MCNC: 0.2 MG/DL (ref 0–1.2)
BLD GP AB SCN SERPL QL: NEGATIVE
CREAT SERPL-MCNC: 0.61 MG/DL (ref 0.57–1)
DEPRECATED RDW RBC AUTO: 39.5 FL (ref 37–54)
ERYTHROCYTE [DISTWIDTH] IN BLOOD BY AUTOMATED COUNT: 14.3 % (ref 12.3–15.4)
HCT VFR BLD AUTO: 35.1 % (ref 34–46.6)
HGB BLD-MCNC: 11.3 G/DL (ref 12–15.9)
LDH SERPL-CCNC: 209 U/L (ref 135–214)
MCH RBC QN AUTO: 24.8 PG (ref 26.6–33)
MCHC RBC AUTO-ENTMCNC: 32.2 G/DL (ref 31.5–35.7)
MCV RBC AUTO: 77.1 FL (ref 79–97)
PLATELET # BLD AUTO: 196 10*3/MM3 (ref 140–450)
PMV BLD AUTO: 10 FL (ref 6–12)
RBC # BLD AUTO: 4.55 10*6/MM3 (ref 3.77–5.28)
RH BLD: POSITIVE
T&S EXPIRATION DATE: NORMAL
URATE SERPL-MCNC: 5.3 MG/DL (ref 2.4–5.7)
WBC NRBC COR # BLD AUTO: 11.06 10*3/MM3 (ref 3.4–10.8)

## 2023-11-30 PROCEDURE — 84460 ALANINE AMINO (ALT) (SGPT): CPT | Performed by: OBSTETRICS & GYNECOLOGY

## 2023-11-30 PROCEDURE — 25810000003 LACTATED RINGERS PER 1000 ML: Performed by: OBSTETRICS & GYNECOLOGY

## 2023-11-30 PROCEDURE — C1755 CATHETER, INTRASPINAL: HCPCS

## 2023-11-30 PROCEDURE — C1755 CATHETER, INTRASPINAL: HCPCS | Performed by: ANESTHESIOLOGY

## 2023-11-30 PROCEDURE — 25010000002 FENTANYL CITRATE (PF) 50 MCG/ML SOLUTION: Performed by: NURSE ANESTHETIST, CERTIFIED REGISTERED

## 2023-11-30 PROCEDURE — 25010000002 MORPHINE PER 10 MG: Performed by: OBSTETRICS & GYNECOLOGY

## 2023-11-30 PROCEDURE — 82565 ASSAY OF CREATININE: CPT | Performed by: OBSTETRICS & GYNECOLOGY

## 2023-11-30 PROCEDURE — 84450 TRANSFERASE (AST) (SGOT): CPT | Performed by: OBSTETRICS & GYNECOLOGY

## 2023-11-30 PROCEDURE — 82247 BILIRUBIN TOTAL: CPT | Performed by: OBSTETRICS & GYNECOLOGY

## 2023-11-30 PROCEDURE — 59025 FETAL NON-STRESS TEST: CPT

## 2023-11-30 PROCEDURE — 84075 ASSAY ALKALINE PHOSPHATASE: CPT | Performed by: OBSTETRICS & GYNECOLOGY

## 2023-11-30 PROCEDURE — 59409 OBSTETRICAL CARE: CPT | Performed by: OBSTETRICS & GYNECOLOGY

## 2023-11-30 PROCEDURE — 84550 ASSAY OF BLOOD/URIC ACID: CPT | Performed by: OBSTETRICS & GYNECOLOGY

## 2023-11-30 PROCEDURE — 86900 BLOOD TYPING SEROLOGIC ABO: CPT | Performed by: OBSTETRICS & GYNECOLOGY

## 2023-11-30 PROCEDURE — 25010000002 ROPIVACAINE PER 1 MG: Performed by: NURSE ANESTHETIST, CERTIFIED REGISTERED

## 2023-11-30 PROCEDURE — 86850 RBC ANTIBODY SCREEN: CPT | Performed by: OBSTETRICS & GYNECOLOGY

## 2023-11-30 PROCEDURE — 83615 LACTATE (LD) (LDH) ENZYME: CPT | Performed by: OBSTETRICS & GYNECOLOGY

## 2023-11-30 PROCEDURE — S0260 H&P FOR SURGERY: HCPCS | Performed by: OBSTETRICS & GYNECOLOGY

## 2023-11-30 PROCEDURE — 36415 COLL VENOUS BLD VENIPUNCTURE: CPT | Performed by: OBSTETRICS & GYNECOLOGY

## 2023-11-30 PROCEDURE — 86901 BLOOD TYPING SEROLOGIC RH(D): CPT | Performed by: OBSTETRICS & GYNECOLOGY

## 2023-11-30 PROCEDURE — 85027 COMPLETE CBC AUTOMATED: CPT | Performed by: OBSTETRICS & GYNECOLOGY

## 2023-11-30 RX ORDER — HYDROCODONE BITARTRATE AND ACETAMINOPHEN 5; 325 MG/1; MG/1
1 TABLET ORAL EVERY 8 HOURS PRN
Status: DISCONTINUED | OUTPATIENT
Start: 2023-11-30 | End: 2023-12-02 | Stop reason: HOSPADM

## 2023-11-30 RX ORDER — DOCUSATE SODIUM 100 MG/1
100 CAPSULE, LIQUID FILLED ORAL 2 TIMES DAILY
Status: DISCONTINUED | OUTPATIENT
Start: 2023-11-30 | End: 2023-12-02 | Stop reason: HOSPADM

## 2023-11-30 RX ORDER — ONDANSETRON 4 MG/1
4 TABLET, FILM COATED ORAL EVERY 6 HOURS PRN
Status: DISCONTINUED | OUTPATIENT
Start: 2023-11-30 | End: 2023-11-30 | Stop reason: HOSPADM

## 2023-11-30 RX ORDER — MORPHINE SULFATE 2 MG/ML
2 INJECTION, SOLUTION INTRAMUSCULAR; INTRAVENOUS
Status: DISCONTINUED | OUTPATIENT
Start: 2023-11-30 | End: 2023-11-30 | Stop reason: HOSPADM

## 2023-11-30 RX ORDER — DOCUSATE SODIUM 100 MG/1
100 CAPSULE, LIQUID FILLED ORAL 2 TIMES DAILY
Status: DISCONTINUED | OUTPATIENT
Start: 2023-11-30 | End: 2023-11-30 | Stop reason: SDUPTHER

## 2023-11-30 RX ORDER — BISACODYL 10 MG
10 SUPPOSITORY, RECTAL RECTAL DAILY PRN
Status: DISCONTINUED | OUTPATIENT
Start: 2023-12-01 | End: 2023-12-02 | Stop reason: HOSPADM

## 2023-11-30 RX ORDER — CITRIC ACID/SODIUM CITRATE 334-500MG
30 SOLUTION, ORAL ORAL ONCE
Status: DISCONTINUED | OUTPATIENT
Start: 2023-11-30 | End: 2023-11-30 | Stop reason: HOSPADM

## 2023-11-30 RX ORDER — PROMETHAZINE HYDROCHLORIDE 12.5 MG/1
12.5 TABLET ORAL EVERY 6 HOURS PRN
Status: DISCONTINUED | OUTPATIENT
Start: 2023-11-30 | End: 2023-11-30 | Stop reason: HOSPADM

## 2023-11-30 RX ORDER — IBUPROFEN 600 MG/1
600 TABLET ORAL EVERY 6 HOURS PRN
Status: DISCONTINUED | OUTPATIENT
Start: 2023-11-30 | End: 2023-11-30 | Stop reason: HOSPADM

## 2023-11-30 RX ORDER — CETIRIZINE HYDROCHLORIDE 10 MG/1
10 TABLET ORAL NIGHTLY
Status: DISCONTINUED | OUTPATIENT
Start: 2023-11-30 | End: 2023-12-02 | Stop reason: HOSPADM

## 2023-11-30 RX ORDER — OXYCODONE HYDROCHLORIDE 5 MG/1
5 TABLET ORAL EVERY 4 HOURS PRN
Status: DISCONTINUED | OUTPATIENT
Start: 2023-11-30 | End: 2023-12-02 | Stop reason: HOSPADM

## 2023-11-30 RX ORDER — FENTANYL CITRATE 50 UG/ML
INJECTION, SOLUTION INTRAMUSCULAR; INTRAVENOUS AS NEEDED
Status: DISCONTINUED | OUTPATIENT
Start: 2023-11-30 | End: 2023-11-30 | Stop reason: SURG

## 2023-11-30 RX ORDER — SODIUM CHLORIDE 0.9 % (FLUSH) 0.9 %
1-10 SYRINGE (ML) INJECTION AS NEEDED
Status: DISCONTINUED | OUTPATIENT
Start: 2023-11-30 | End: 2023-12-02 | Stop reason: HOSPADM

## 2023-11-30 RX ORDER — ONDANSETRON 2 MG/ML
4 INJECTION INTRAMUSCULAR; INTRAVENOUS EVERY 6 HOURS PRN
Status: DISCONTINUED | OUTPATIENT
Start: 2023-11-30 | End: 2023-11-30 | Stop reason: HOSPADM

## 2023-11-30 RX ORDER — SODIUM CHLORIDE, SODIUM LACTATE, POTASSIUM CHLORIDE, CALCIUM CHLORIDE 600; 310; 30; 20 MG/100ML; MG/100ML; MG/100ML; MG/100ML
125 INJECTION, SOLUTION INTRAVENOUS CONTINUOUS
Status: DISCONTINUED | OUTPATIENT
Start: 2023-11-30 | End: 2023-12-01

## 2023-11-30 RX ORDER — PROMETHAZINE HYDROCHLORIDE 25 MG/1
25 TABLET ORAL EVERY 6 HOURS PRN
Status: DISCONTINUED | OUTPATIENT
Start: 2023-11-30 | End: 2023-12-02 | Stop reason: HOSPADM

## 2023-11-30 RX ORDER — EPHEDRINE SULFATE 5 MG/ML
INJECTION INTRAVENOUS
Status: DISCONTINUED
Start: 2023-11-30 | End: 2023-12-02 | Stop reason: HOSPADM

## 2023-11-30 RX ORDER — FENTANYL CITRATE 50 UG/ML
25 INJECTION, SOLUTION INTRAMUSCULAR; INTRAVENOUS
Status: DISCONTINUED | OUTPATIENT
Start: 2023-11-30 | End: 2023-11-30 | Stop reason: HOSPADM

## 2023-11-30 RX ORDER — ACETAMINOPHEN 325 MG/1
650 TABLET ORAL EVERY 6 HOURS PRN
Status: DISCONTINUED | OUTPATIENT
Start: 2023-11-30 | End: 2023-12-02 | Stop reason: HOSPADM

## 2023-11-30 RX ORDER — BISACODYL 10 MG
10 SUPPOSITORY, RECTAL RECTAL DAILY PRN
Status: DISCONTINUED | OUTPATIENT
Start: 2023-12-01 | End: 2023-11-30 | Stop reason: SDUPTHER

## 2023-11-30 RX ORDER — PROMETHAZINE HYDROCHLORIDE 12.5 MG/1
12.5 SUPPOSITORY RECTAL EVERY 6 HOURS PRN
Status: DISCONTINUED | OUTPATIENT
Start: 2023-11-30 | End: 2023-11-30 | Stop reason: HOSPADM

## 2023-11-30 RX ORDER — SODIUM CHLORIDE 0.9 % (FLUSH) 0.9 %
10 SYRINGE (ML) INJECTION AS NEEDED
Status: DISCONTINUED | OUTPATIENT
Start: 2023-11-30 | End: 2023-11-30 | Stop reason: HOSPADM

## 2023-11-30 RX ORDER — FAMOTIDINE 10 MG/ML
20 INJECTION, SOLUTION INTRAVENOUS ONCE AS NEEDED
Status: DISCONTINUED | OUTPATIENT
Start: 2023-11-30 | End: 2023-11-30 | Stop reason: HOSPADM

## 2023-11-30 RX ORDER — ACETAMINOPHEN 325 MG/1
650 TABLET ORAL EVERY 6 HOURS PRN
Status: DISCONTINUED | OUTPATIENT
Start: 2023-11-30 | End: 2023-11-30 | Stop reason: SDUPTHER

## 2023-11-30 RX ORDER — LIDOCAINE HYDROCHLORIDE 10 MG/ML
0.5 INJECTION, SOLUTION EPIDURAL; INFILTRATION; INTRACAUDAL; PERINEURAL ONCE AS NEEDED
Status: DISCONTINUED | OUTPATIENT
Start: 2023-11-30 | End: 2023-11-30 | Stop reason: HOSPADM

## 2023-11-30 RX ORDER — CETIRIZINE HYDROCHLORIDE 10 MG/1
10 TABLET ORAL DAILY
Status: DISCONTINUED | OUTPATIENT
Start: 2023-12-01 | End: 2023-11-30

## 2023-11-30 RX ORDER — ROPIVACAINE HYDROCHLORIDE 2 MG/ML
15 INJECTION, SOLUTION EPIDURAL; INFILTRATION; PERINEURAL CONTINUOUS
Status: DISCONTINUED | OUTPATIENT
Start: 2023-11-30 | End: 2023-12-01

## 2023-11-30 RX ORDER — OXYTOCIN/0.9 % SODIUM CHLORIDE 30/500 ML
999 PLASTIC BAG, INJECTION (ML) INTRAVENOUS ONCE
Status: COMPLETED | OUTPATIENT
Start: 2023-11-30 | End: 2023-11-30

## 2023-11-30 RX ORDER — HYDROCORTISONE 25 MG/G
1 CREAM TOPICAL AS NEEDED
Status: DISCONTINUED | OUTPATIENT
Start: 2023-11-30 | End: 2023-11-30 | Stop reason: SDUPTHER

## 2023-11-30 RX ORDER — OXYTOCIN/0.9 % SODIUM CHLORIDE 30/500 ML
125 PLASTIC BAG, INJECTION (ML) INTRAVENOUS CONTINUOUS PRN
Status: DISCONTINUED | OUTPATIENT
Start: 2023-11-30 | End: 2023-12-02 | Stop reason: HOSPADM

## 2023-11-30 RX ORDER — SODIUM CHLORIDE 9 MG/ML
40 INJECTION, SOLUTION INTRAVENOUS AS NEEDED
Status: DISCONTINUED | OUTPATIENT
Start: 2023-11-30 | End: 2023-11-30 | Stop reason: HOSPADM

## 2023-11-30 RX ORDER — IBUPROFEN 600 MG/1
600 TABLET ORAL EVERY 6 HOURS SCHEDULED
Status: DISCONTINUED | OUTPATIENT
Start: 2023-11-30 | End: 2023-12-02 | Stop reason: HOSPADM

## 2023-11-30 RX ORDER — ONDANSETRON 2 MG/ML
4 INJECTION INTRAMUSCULAR; INTRAVENOUS ONCE AS NEEDED
Status: DISCONTINUED | OUTPATIENT
Start: 2023-11-30 | End: 2023-11-30 | Stop reason: HOSPADM

## 2023-11-30 RX ORDER — OXYCODONE HYDROCHLORIDE 10 MG/1
10 TABLET ORAL EVERY 4 HOURS PRN
Status: DISCONTINUED | OUTPATIENT
Start: 2023-11-30 | End: 2023-12-02 | Stop reason: HOSPADM

## 2023-11-30 RX ORDER — ACETAMINOPHEN 325 MG/1
650 TABLET ORAL EVERY 4 HOURS PRN
Status: DISCONTINUED | OUTPATIENT
Start: 2023-11-30 | End: 2023-11-30 | Stop reason: HOSPADM

## 2023-11-30 RX ORDER — ACETAMINOPHEN 325 MG/1
650 TABLET ORAL EVERY 4 HOURS PRN
Status: DISCONTINUED | OUTPATIENT
Start: 2023-11-30 | End: 2023-11-30 | Stop reason: SDUPTHER

## 2023-11-30 RX ORDER — HYDROCODONE BITARTRATE AND ACETAMINOPHEN 5; 325 MG/1; MG/1
2 TABLET ORAL EVERY 6 HOURS PRN
Status: DISCONTINUED | OUTPATIENT
Start: 2023-11-30 | End: 2023-12-02 | Stop reason: HOSPADM

## 2023-11-30 RX ORDER — LIDOCAINE HYDROCHLORIDE AND EPINEPHRINE 15; 5 MG/ML; UG/ML
INJECTION, SOLUTION EPIDURAL AS NEEDED
Status: DISCONTINUED | OUTPATIENT
Start: 2023-11-30 | End: 2023-11-30 | Stop reason: SURG

## 2023-11-30 RX ORDER — SODIUM CHLORIDE 0.9 % (FLUSH) 0.9 %
10 SYRINGE (ML) INJECTION EVERY 12 HOURS SCHEDULED
Status: DISCONTINUED | OUTPATIENT
Start: 2023-11-30 | End: 2023-11-30 | Stop reason: HOSPADM

## 2023-11-30 RX ORDER — OXYTOCIN/0.9 % SODIUM CHLORIDE 30/500 ML
250 PLASTIC BAG, INJECTION (ML) INTRAVENOUS CONTINUOUS
Status: ACTIVE | OUTPATIENT
Start: 2023-11-30 | End: 2023-11-30

## 2023-11-30 RX ORDER — EPHEDRINE SULFATE 5 MG/ML
10 INJECTION INTRAVENOUS
Status: DISCONTINUED | OUTPATIENT
Start: 2023-11-30 | End: 2023-11-30 | Stop reason: HOSPADM

## 2023-11-30 RX ORDER — HYDROCORTISONE 25 MG/G
1 CREAM TOPICAL AS NEEDED
Status: DISCONTINUED | OUTPATIENT
Start: 2023-11-30 | End: 2023-12-02 | Stop reason: HOSPADM

## 2023-11-30 RX ORDER — AMOXICILLIN 500 MG/1
1000 CAPSULE ORAL 2 TIMES DAILY
COMMUNITY

## 2023-11-30 RX ORDER — DIPHENHYDRAMINE HYDROCHLORIDE 50 MG/ML
12.5 INJECTION INTRAMUSCULAR; INTRAVENOUS EVERY 8 HOURS PRN
Status: DISCONTINUED | OUTPATIENT
Start: 2023-11-30 | End: 2023-11-30 | Stop reason: HOSPADM

## 2023-11-30 RX ORDER — IBUPROFEN 600 MG/1
600 TABLET ORAL EVERY 6 HOURS PRN
Status: DISCONTINUED | OUTPATIENT
Start: 2023-11-30 | End: 2023-12-02 | Stop reason: HOSPADM

## 2023-11-30 RX ORDER — MAGNESIUM CARB/ALUMINUM HYDROX 105-160MG
30 TABLET,CHEWABLE ORAL ONCE
Status: DISCONTINUED | OUTPATIENT
Start: 2023-11-30 | End: 2023-11-30 | Stop reason: HOSPADM

## 2023-11-30 RX ORDER — OXYTOCIN/0.9 % SODIUM CHLORIDE 30/500 ML
125 PLASTIC BAG, INJECTION (ML) INTRAVENOUS CONTINUOUS PRN
Status: DISCONTINUED | OUTPATIENT
Start: 2023-11-30 | End: 2023-11-30 | Stop reason: SDUPTHER

## 2023-11-30 RX ADMIN — FENTANYL CITRATE 100 MCG: 50 INJECTION, SOLUTION INTRAMUSCULAR; INTRAVENOUS at 10:38

## 2023-11-30 RX ADMIN — SODIUM CHLORIDE, POTASSIUM CHLORIDE, SODIUM LACTATE AND CALCIUM CHLORIDE 125 ML/HR: 600; 310; 30; 20 INJECTION, SOLUTION INTRAVENOUS at 07:25

## 2023-11-30 RX ADMIN — LIDOCAINE HYDROCHLORIDE AND EPINEPHRINE 3 ML: 15; 5 INJECTION, SOLUTION EPIDURAL at 10:35

## 2023-11-30 RX ADMIN — MORPHINE SULFATE 2 MG: 2 INJECTION, SOLUTION INTRAMUSCULAR; INTRAVENOUS at 07:50

## 2023-11-30 RX ADMIN — CETIRIZINE HYDROCHLORIDE 10 MG: 10 TABLET, FILM COATED ORAL at 20:37

## 2023-11-30 RX ADMIN — HYDROCORTISONE 1 APPLICATION: 25 CREAM TOPICAL at 15:53

## 2023-11-30 RX ADMIN — IBUPROFEN 600 MG: 600 TABLET, FILM COATED ORAL at 23:15

## 2023-11-30 RX ADMIN — IBUPROFEN 600 MG: 600 TABLET, FILM COATED ORAL at 18:37

## 2023-11-30 RX ADMIN — SODIUM CHLORIDE, POTASSIUM CHLORIDE, SODIUM LACTATE AND CALCIUM CHLORIDE 125 ML/HR: 600; 310; 30; 20 INJECTION, SOLUTION INTRAVENOUS at 08:52

## 2023-11-30 RX ADMIN — WITCH HAZEL 1 PAD: 500 SOLUTION RECTAL; TOPICAL at 15:54

## 2023-11-30 RX ADMIN — ROPIVACAINE HYDROCHLORIDE 15 ML/HR: 2 INJECTION, SOLUTION EPIDURAL; INFILTRATION at 10:43

## 2023-11-30 RX ADMIN — Medication 1 APPLICATION: at 15:53

## 2023-11-30 RX ADMIN — DOCUSATE SODIUM 100 MG: 100 CAPSULE, LIQUID FILLED ORAL at 20:37

## 2023-11-30 RX ADMIN — ROPIVACAINE HYDROCHLORIDE 10 ML: 5 INJECTION, SOLUTION EPIDURAL; INFILTRATION; PERINEURAL at 10:42

## 2023-11-30 RX ADMIN — Medication 999 ML/HR: at 11:33

## 2023-11-30 RX ADMIN — Medication: at 15:54

## 2023-11-30 NOTE — ANESTHESIA PREPROCEDURE EVALUATION
Anesthesia Evaluation     Patient summary reviewed and Nursing notes reviewed   NPO Solid Status: > 6 hours  NPO Liquid Status: > 6 hours           Airway   Dental      Pulmonary - negative pulmonary ROS   Cardiovascular - negative cardio ROS        Neuro/Psych- negative ROS  (+) psychiatric history Anxiety and Depression  GI/Hepatic/Renal/Endo - negative ROS     Musculoskeletal (-) negative ROS    Abdominal    Substance History - negative use     OB/GYN negative ob/gyn ROS   (+) Pregnant        Other                    Anesthesia Plan    ASA 2     epidural       Anesthetic plan, risks, benefits, and alternatives have been provided, discussed and informed consent has been obtained with: patient.    CODE STATUS:    Level Of Support Discussed With: Patient  Code Status (Patient has no pulse and is not breathing): CPR (Attempt to Resuscitate)  Medical Interventions (Patient has pulse or is breathing): Full Support

## 2023-11-30 NOTE — LACTATION NOTE
"   23 1528   Maternal Information   Date of Referral 23   Person Making Referral lactation consultant  (newly postpartum)   Maternal Reason for Referral   (tried to nurse 1st child for few months, then pumped up to 4 months, then back to work and milk supply decreased; reporting well with latching current infant)   Infant Reason for Referral  infant   Maternal Assessment   Breast Size Issue none   Breast Shape Bilateral:;round   Breast Density Bilateral:;soft   Nipples Bilateral:;everted   Left Nipple Symptoms intact;nontender   Right Nipple Symptoms intact;nontender   Maternal Infant Feeding   Maternal Emotional State independent   Infant Positioning   (mother nursed infant prior to lactation visit and reporting well with latching; mother stated \"much better than her 1st child\")   Pain with Feeding no   Latch Assistance none needed;verbal guidance offered  (encouraged to call lactation for latch assistance if needed)   Support Person Involvement actively supporting mother  (mother at bedside)   Milk Expression/Equipment   Breast Pump Type double electric, personal   Equipment for Home Use breast pump ordered through insurance  (offered to get Spectra pump from [employer] supply, but patient would like to try the Motif pump from insurance company that was ordered)   Breast Pumping   Breast Pumping Interventions early pumping promoted  (for short/missed feedings and if supplementing)     Courtesy visit with newly postpartum couplet; mother reporting well with latching current infant; mother stated she has no pain when infant latches and stated, \"much better than my first child\"; infant had nursed prior to lactation visit; encouraged mother to call lactation for latch assistance if needed; went over education materials; mother has ordered pump through her insurance company; offered to get her a Spectra pump from our supply, but mother stated she wants to try the Motif pump that she gets from her " insurance company; encouraged skin to skin; encouraged to call lactation as needed.

## 2023-11-30 NOTE — L&D DELIVERY NOTE
" [facility]   Vaginal Delivery Note    Patient Name: Jenny Brunson  : 2000  MRN: [id number]    Date of Delivery: 2023     Diagnosis     Pre & Post-Delivery:  Intrauterine pregnancy at 38w5d  Labor status: Spontaneous Onset of Labor     Normal labor             Problem List    Transfer to Postpartum     Review the Delivery Report for details.     Delivery     Delivery: Vaginal, Spontaneous     YOB: 2021    Time of Birth:  Gestational Age 11:19 AM   38w5d     Anesthesia: Epidural     Delivering clinician:     Forceps?   No   Vacuum? No    Shoulder dystocia present: No        Delivery narrative: Patient delivered rapidly under epidural vaginally just as I entered the room.  Over no episiotomy a female.  Baby suction cord cut and clamped baby crying handed off to nurses.  Cord blood obtained placenta expressed uterus explored.  No lacerations noted .  Baby and mom doing well      Infant     Findings: female  infant     Infant observations: Weight: 2845 g (6 lb 4.4 oz)   Length: 18.25  in  Observations/Comments:        Apgars: 8  @ 1 minute /    9  @ 5 minutes   Infant Name:      Placenta & Cord         Placenta delivered  Spontaneous  at   2023 11:33 AM     Cord: 3 vessels  present.   Nuchal Cord?  no   Cord blood obtained: Yes    Cord gases obtained:  No    Cord gas results: Venous:  No results found for: \"PHCVEN\", \"BECVEN\"    Arterial:  No results found for: \"PHCART\", \"BECART\"     Repair     Episiotomy: None     No    Lacerations: No   Estimated Blood Loss: Est. Blood Loss (mL): 100 mL (Filed from Delivery Summary) (23 1119)     Quantitative Blood Loss:          Complications     none    Disposition     Mother to Mother Baby/Postpartum  in stable condition currently.  Baby to NBN  in stable condition currently.    Nikki Saeed MD  23  14:02 EST        "

## 2023-11-30 NOTE — PAYOR COMM NOTE
"Eliseo Brunson (23 y.o. Female)     [place] Medicaid ID#[id number]    Delivery information:  Vaginal Delivery 11/30/23 @ 11:19  Wt 2845 gms  Female  Apgars 8/9    From: Lucy Duron LPN, Utilization Review  Phone #[phone]  Fax #[phone]        Date of Birth   2000    Social Security Number       Address   [address] [place] [address]    Home Phone   [phone]    MRN   [id number]       Latter-day   None    Marital Status   Single                            Admission Date   11/30/23    Admission Type   Elective    Admitting Provider   Itzel Alvarez MD    Attending Provider   Itzel Alvarez MD    Department, Room/Bed   [facility] 4A, N419/1       Discharge Date       Discharge Disposition       Discharge Destination                                 Attending Provider: Itzel Alvarez MD    Allergies: No Known Allergies    Isolation: None   Infection: None   Code Status: CPR    Ht: 175.3 cm (69\")   Wt: 100 kg (221 lb)    Admission Cmt: None   Principal Problem: Normal labor [O80,Z37.9]                   Active Insurance as of 11/30/2023       Primary Coverage       Payor Plan Insurance Group Employer/Plan Group    ANTHEM MEDICAID ANTHEM MEDICAID [id number]       Payor Plan Address Payor Plan Phone Number Payor Plan Fax Number Effective Dates    [address] [address] [phone]  12/1/2021 - None Entered    [facility] [id number]         Subscriber Name Subscriber Birth Date Member ID       MARCOSELISEO ROBERT ENE 2000 [id number]                     Emergency Contacts        (Rel.) Home Phone Work Phone Mobile Phone    Venecia Hood (Mother) [phone] -- [phone]              Insurance Information                  ANTHEM MEDICAID/ANTHEM MEDICAID Phone: [phone]    Subscriber: Eliseo Brunson Subscriber#: [id number]    Group#: [id number] Precert#: --             History & " Physical        Itzel Alvarez MD at 23 0800          [facility]  Obstetric History and Physical    Chief Complaint   Patient presents with    Contractions       Subjective    Patient is a 23 y.o. female  currently at 38w5d, who presents in active labor.    Her prenatal care is benign.  Her previous obstetric/gynecological history is noted for is remarkable for previous TSVD.    The following portions of the patients history were reviewed and updated as appropriate: current medications, allergies, past medical history, past surgical history, past family history, past social history, and problem list .       Prenatal Information:  Prenatal Results       Initial Prenatal Labs       Test Value Reference Range Date Time    Hemoglobin  12.3 g/dL 11.1 - 15.9 23 0926    Hematocrit  38.2 % 34.0 - 46.6 23 0926    Platelets  298 x10E3/uL 150 - 450 23 0926    Rubella IgG  <0.90 index Immune >0.99 23 0926    Hepatitis B SAg  Negative  Negative 23 0926    Hepatitis C Ab  Non Reactive  Non Reactive 23 0926    RPR  Non Reactive  Non Reactive 23 0926    T. Pallidum Ab         ABO  A   23 0648    Rh  Positive   23 0648    Antibody Screen  Negative  Negative 23 0926    HIV  Non Reactive  Non Reactive 23 0926    Urine Culture  Final report   23        Final report   23 0926    Gonorrhea  Negative  Negative 23 0926    Chlamydia  Negative  Negative 23 0926    TSH        HgB A1c         Varicella IgG        HgB Electrophoresis         Cystic fibrosis                   Fetal testing        Test Value Reference Range Date Time    NIPT        MSAFP        AFP-4                  2nd and 3rd Trimester       Test Value Reference Range Date Time    Hemoglobin (repeated)  11.3 g/dL 12.0 - 15.9 23 0648       11.8 g/dL 12.0 - 15.9 23 1147    Hematocrit (repeated)  35.1 % 34.0 - 46.6 23 0648       35.1 % 34.0 - 46.6  09/07/23 1147    Platelets   196 10*3/mm3 140 - 450 11/30/23 0648       202 10*3/mm3 140 - 450 09/07/23 1147       298 x10E3/uL 150 - 450 04/17/23 0926    GCT  116 mg/dL 65 - 139 09/07/23 1147    Antibody Screen (repeated)  Negative   11/30/23 0648       Negative  Negative 09/07/23 1147       Negative  Negative 04/17/23 0926    Third Trimester syphilis screen (repeated)   Non Reactive  Non Reactive 04/17/23 0926    GTT Fasting        GTT 1 Hr        GTT 2 Hr        GTT 3 Hr        Group B Strep  No Group B Streptococcus isolated   11/23/23 1323              Other testing        Test Value Reference Range Date Time    Parvo IgG         CMV IgG                   Drug Screening       Test Value Reference Range Date Time    Amphetamine Screen  Negative ng/mL Tgjzhr=6909 04/17/23 0926    Barbiturate Screen  Negative ng/mL Kpbmqc=633 04/17/23 0926    Benzodiazepine Screen  Negative ng/mL Tdvrml=022 04/17/23 0926    Methadone Screen  Negative ng/mL Itdcos=492 04/17/23 0926    Phencyclidine Screen  Negative ng/mL Cutoff=25 04/17/23 0926    Opiates Screen  Negative ng/mL Sfkihb=567 04/17/23 0926    THC Screen  Negative ng/mL Cutoff=20 04/17/23 0926    Cocaine Screen  Negative ng/mL Nfhoaw=133 04/17/23 0926    Propoxyphene Screen  Negative ng/mL Kznfit=543 04/17/23 0926    Buprenorphine Screen        Methamphetamine Screen        Oxycodone Screen        Tricyclic Antidepressants Screen                  Legend    ^: Historical                          External Prenatal Results       Pregnancy Outside Results - Transcribed From Office Records - See Scanned Records For Details       Test Value Date Time    ABO  A  11/30/23 0648    Rh  Positive  11/30/23 0648    Antibody Screen  Negative  11/30/23 0648       Negative  09/07/23 1147       Negative  04/17/23 0926    Varicella IgG       Rubella  <0.90 index 04/17/23 0926    Hgb  11.3 g/dL 11/30/23 0648       11.8 g/dL 09/07/23 1147       12.3 g/dL 04/17/23 0926    Hct  35.1 %  23 0648       35.1 % 23 1147       38.2 % 23 09    Glucose Fasting GTT       Glucose Tolerance Test 1 hour       Glucose Tolerance Test 3 hour       Gonorrhea (discrete)  Negative  23 09    Chlamydia (discrete)  Negative  23    RPR  Non Reactive  23    VDRL       Syphilis Antibody       HBsAg  Negative  23    Herpes Simplex Virus PCR       Herpes Simplex VIrus Culture       HIV  Non Reactive  23 09    Hep C RNA Quant PCR       Hep C Antibody  Non Reactive  23 09    AFP       Group B Strep  No Group B Streptococcus isolated  23 1323    GBS Susceptibility to Clindamycin       GBS Susceptibility to Erythromycin       Fetal Fibronectin       Genetic Testing, Maternal Blood                 Drug Screening       Test Value Date Time    Urine Drug Screen       Amphetamine Screen  Negative ng/mL 23    Barbiturate Screen  Negative ng/mL 23    Benzodiazepine Screen  Negative ng/mL 23 09    Methadone Screen  Negative ng/mL 23 09    Phencyclidine Screen  Negative ng/mL 23    Opiates Screen       THC Screen  PRESUMPTIVE POSITIVE  16    Cocaine Screen       Propoxyphene Screen  Negative ng/mL 23 09    Buprenorphine Screen ^ <10 ng/mL 01/10/22 1500      ^ <50 ng/mL 01/10/22 1500    Methamphetamine Screen       Oxycodone Screen       Tricyclic Antidepressants Screen  Negative  16              Legend    ^: Historical                             Past OB History:     OB History    Para Term  AB Living   2 1 1 0 0 1   SAB IAB Ectopic Molar Multiple Live Births   0 0 0 0 0 1      # Outcome Date GA Lbr Jay/2nd Weight Sex Delivery Anes PTL Lv   2 Current            1 Term 08/10/22 39w6d 13:21 / 01:19 2850 g (6 lb 4.5 oz) F Vag-Spont EPI N LEXY      Name: Alyssa      Apgar1: 8  Apgar5: 9      Obstetric Comments    - Nova       Past Medical History: Past Medical  History:   Diagnosis Date    Childhood asthma 2007    Depression with anxiety 2016      Past Surgical History Past Surgical History:   Procedure Laterality Date    TONSILLECTOMY  2017    due to abscess    WISDOM TOOTH EXTRACTION  2020      Family History: Family History   Problem Relation Age of Onset    Skin cancer Mother     Autism Brother     Bipolar disorder Brother     Psychosis Brother       Social History:  reports that she has been smoking electronic cigarette. She uses smokeless tobacco.   reports that she does not currently use alcohol.   reports no history of drug use.        Review of Systems:    All other systems reviewed and negative    Objective    Vital Signs Range for the last 24 hours  Temperature: Temp:  [97.6 °F (36.4 °C)-97.9 °F (36.6 °C)] 97.8 °F (36.6 °C)   Temp Source: Temp src: Oral   BP: BP: (112-128)/(55-71) 114/58   Pulse: Heart Rate:  [] 75   Respirations: Resp:  [18] 18   SPO2:     O2 Amount (l/min):     O2 Devices Device (Oxygen Therapy): room air   Weight: Weight:  [100 kg (221 lb)] 100 kg (221 lb)     Physical Examination: General appearance - alert, well appearing, and in no distress  Neck - supple, no significant adenopathy  Abdomen - soft, nontender, nondistended, no masses or organomegaly  Pelvic - normal external genitalia, vulva, vagina, cervix, uterus and adnexa  Musculoskeletal - no joint tenderness, deformity or swelling  Extremities - peripheral pulses normal, no pedal edema, no clubbing or cyanosis  Skin - normal coloration and turgor, no rashes, no suspicious skin lesions noted    Presentation: vertex   Cervix: Exam by:  MD   Dilation:  5   Effacement:  90   Station:  -1     Fetal Heart Rate Assessment   Method:     Beats/min:     Baseline:     Varibility:     Accels:     Decels:     Tracing Category:  Category 1     Uterine Assessment   Method:     Frequency (min):     Ctx Count in 10 min:     Duration:     Intensity:     Intensity by IUPC:     Resting Tone:      Resting Tone by IUPC:     [place] Units:  Ctx's q~4min     Laboratory Results: GBS neg    Assessment & Plan      Normal labor      Assessment & Plan    Assessment:  1.  Intrauterine pregnancy at 38w5d weeks gestation with reassuring fetal status.    2.  labor  without ROM      Plan:  1. fetal and uterine monitoring  continuously, expectant management, and analgesia with  epidural prn  2. Plan of care has been reviewed with patient and she wishes to wait for her mother to arrive prior to considering augmentation.  She is here but with 5yo and need childcare.  3.  Risks, benefits of treatment plan have been discussed.  4.  All questions have been answered.        Itzel Alvarez MD  2023  08:30    Electronically signed by Itzel Alvarez MD at 23 1223       Facility-Administered Medications as of 2023   Medication Dose Route Frequency Provider Last Rate Last Admin    ePHEDrine Sulfate (Pressors) 5 MG/ML injection  - ADS Override Pull             lactated ringers bolus 1,000 mL  1,000 mL Intravenous Once Florentino Mesa MD        lactated ringers infusion  125 mL/hr Intravenous Continuous Florentino Mesa MD   Stopped at 23 1133    [COMPLETED] oxytocin (PITOCIN) 30 units in 0.9% sodium chloride 500 mL (premix)  999 mL/hr Intravenous Once Florentino Mesa  mL/hr at 23 1133 999 mL/hr at 23 1133    Followed by    [] oxytocin (PITOCIN) 30 units in 0.9% sodium chloride 500 mL (premix)  250 mL/hr Intravenous Continuous Florentino Mesa  mL/hr at 23 1150 250 mL/hr at 23 1150    ropivacaine (NAROPIN) 0.2 % injection  15 mL/hr Epidural Continuous Yvette Woodward CRNA   Stopped at 23 1145     Orders (last 24 hrs)        Start     Ordered    23 1225  VTE Prophylaxis Not Indicated: Recent Trauma and/or Surgery (2); </= 3 (Low Risk)  Once         23 1224    23 1204  Diet: Regular/House Diet; Texture: Regular Texture (IDDSI  7); Fluid Consistency: Thin (IDDSI 0)  Diet Effective Now         11/30/23 1204    11/30/23 1203  Nurse may remove epidural catheter after delivery.  Until Discontinued         11/30/23 1202    11/30/23 1203  Transfer to Postpartum When Criteria Met  Until Discontinued         11/30/23 1202    11/30/23 0930  ropivacaine (NAROPIN) 0.2 % injection  Continuous         11/30/23 0838    11/30/23 0930  Sod Citrate-Citric Acid (BICITRA) oral solution 30 mL  Once,   Status:  Discontinued         11/30/23 0838 11/30/23 0900  sodium chloride 0.9 % flush 10 mL  Every 12 Hours Scheduled,   Status:  Discontinued         11/30/23 0657    11/30/23 0838  Vital Signs Per Anesthesia Guidelines  Per Order Details        Comments: Every 3 Minutes x20 Minutes Following Epidural Dosing, Then Every 15 Minutes If Stable    11/30/23 0838 11/30/23 0838  Fetal Heart Rate Monitor  Once         11/30/23 0838 11/30/23 0838  Nurse or anesthesiologist to remain with patient for 15 minutes following dosing.  Until Discontinued         11/30/23 0838 11/30/23 0838  Facilitate maternal postion on side and maintain uterine displacement.  Until Discontinued         11/30/23 0838 11/30/23 0838  Consult anesthesia services prior to changing epidural infusion/rate.  Until Discontinued         11/30/23 0838 11/30/23 0838  Notify physician for the following conditions:  Until Discontinued         11/30/23 0838 11/30/23 0837  ePHEDrine Sulfate (Pressors) 5 MG/ML injection 10 mg  Every 10 Minutes PRN,   Status:  Discontinued         11/30/23 0838 11/30/23 0837  ondansetron (ZOFRAN) injection 4 mg  Once As Needed,   Status:  Discontinued         11/30/23 0838 11/30/23 0837  famotidine (PEPCID) injection 20 mg  Once As Needed,   Status:  Discontinued         11/30/23 0838 11/30/23 0837  diphenhydrAMINE (BENADRYL) injection 12.5 mg  Every 8 Hours PRN,   Status:  Discontinued         11/30/23 0838 11/30/23 0837  lactated ringers  bolus 1,000 mL  As Needed,   Status:  Discontinued         11/30/23 0838    11/30/23 0800  Vital Signs q 4 while awake  Every 4 Hours      Comments: While the patient is awake.    11/30/23 0657 11/30/23 0800  oxytocin (PITOCIN) 30 units in 0.9% sodium chloride 500 mL (premix)  Continuous        See Hyperspace for full Linked Orders Report.    11/30/23 0657 11/30/23 0745  lactated ringers bolus 1,000 mL  Once         11/30/23 0652    11/30/23 0745  lactated ringers bolus 1,000 mL  Once,   Status:  Discontinued         11/30/23 0657    11/30/23 0745  lactated ringers infusion  Continuous         11/30/23 0657 11/30/23 0745  mineral oil liquid 30 mL  Once,   Status:  Discontinued         11/30/23 0657 11/30/23 0745  oxytocin (PITOCIN) 30 units in 0.9% sodium chloride 500 mL (premix)  Once        See Hyperspace for full Linked Orders Report.    11/30/23 0657 11/30/23 0742  ePHEDrine Sulfate (Pressors) 5 MG/ML injection  - ADS Override Pull        Note to Pharmacy: Created by cabinet override    11/30/23 0742    11/30/23 0657  VTE Prophylaxis Not Indicated: Reduced Mobility (3); </= 3 (Low Risk)  Once         11/30/23 0657 11/30/23 0657  Diet: Liquid Diets; Clear Liquid; Fluid Consistency: Thin (IDDSI 0)  Diet Effective Now,   Status:  Canceled         11/30/23 0657 11/30/23 0656  fentaNYL citrate (PF) (SUBLIMAZE) injection 25 mcg  Every 1 Hour PRN,   Status:  Discontinued         11/30/23 0657 11/30/23 0656  promethazine (PHENERGAN) tablet 12.5 mg  Every 6 Hours PRN,   Status:  Discontinued         11/30/23 0657 11/30/23 0656  ondansetron (ZOFRAN) tablet 4 mg  Every 6 Hours PRN,   Status:  Discontinued        See Hyperspace for full Linked Orders Report.    11/30/23 0657 11/30/23 0656  ondansetron (ZOFRAN) injection 4 mg  Every 6 Hours PRN,   Status:  Discontinued        See Hyperspace for full Linked Orders Report.    11/30/23 0657    11/30/23 0656  Admit To Obstetrics Inpatient  Once          23  Code Status and Medical Interventions:  Continuous         23  Vital Signs Per hospital policy  Per Hospital Policy         23  Keep exams to a minimum on patients with SROM  Until Discontinued         23  Continuous Fetal Monitoring With NST on Admission and Prior to Initiation of Oxytocin.  Per Order Details        Comments: Continuous Fetal Monitoring With NST on Admission & Prior to Initiation of Oxytocin.    23  External Uterine Contraction Monitoring  Per Hospital Policy         23  Notify Physician (specified)  Until Discontinued         23  Notify physician for hyperstimulus (per hospital algorithm)  Until Discontinued         23  Notify physician if membranes ruptured, bleeding greater than 1 pad an hour, fetal heart tone abnormality, and severe pain  Until Discontinued         23  Up as Tolerated  Until Discontinued         23  Initiate Group Beta Strep (GBS) Prophylaxis Protocol, If Criteria Met  Continuous        Comments: NO TREATMENT RECOMMENDED IF: 1)  Maternal GBS status known negative 2)  Scheduled  birth with intact membranes, not in labor.  3 ) Maternal GBS unknown, no risk factors.   TREAT WITH ANTIBIOTICS IF:  1)  Maternal GBS status is known postive.  2)  Maternal GBS status unknown with these risk factors:  a)  Previous infant affected by GBS infection.  b)  GBS urinary tract infection (UTI) or bacteruria during pregnancy  c)  Unexplained maternal fever in labor (greater than or equal to 100.4F or 38.0C)  d)  Prolonged rupture of the membranes greater than or equal to 18 hours.  e)  Gestational age less than 37 weeks.    23  Insert Peripheral IV  Once         23     11/30/23 0656  Saline Lock & Maintain IV Access  Continuous         11/30/23 0657 11/30/23 0656  Notify Physician (specified)  Until Discontinued         11/30/23 0657 11/30/23 0656  Notify physician (specify)  Until Discontinued         11/30/23 0657 11/30/23 0656  Vital Signs Per hospital policy  Per Hospital Policy         11/30/23 0657 11/30/23 0656  Up as Tolerated  Until Discontinued         11/30/23 0657 11/30/23 0656  Fundal & Lochia Check  Per Order Details        Comments: Every 15 Minutes x4, Then Every 30 Minutes x2, Then Every Shift    11/30/23 0657 11/30/23 0656  Fundal & Lochia Check  Every Shift       11/30/23 0657 11/30/23 0656  Diet: Regular/House Diet; Texture: Regular Texture (IDDSI 7); Fluid Consistency: Thin (IDDSI 0)  Diet Effective Now,   Status:  Canceled         11/30/23 0657 11/30/23 0656  Advance Diet As Tolerated -  Until Discontinued         11/30/23 0657 11/30/23 0656  acetaminophen (TYLENOL) tablet 650 mg  Every 4 Hours PRN,   Status:  Discontinued         11/30/23 0657 11/30/23 0656  ibuprofen (ADVIL,MOTRIN) tablet 600 mg  Every 6 Hours PRN,   Status:  Discontinued         11/30/23 0657 11/30/23 0656  morphine injection 2 mg  Every 2 Hours PRN,   Status:  Discontinued         11/30/23 0657 11/30/23 0656  morphine injection 2 mg  Every 2 Hours PRN,   Status:  Discontinued         11/30/23 0657 11/30/23 0656  promethazine (PHENERGAN) suppository 12.5 mg  Every 6 Hours PRN,   Status:  Discontinued        See Hyperspace for full Linked Orders Report.    11/30/23 0657 11/30/23 0656  promethazine (PHENERGAN) tablet 12.5 mg  Every 6 Hours PRN,   Status:  Discontinued        See Hyperspace for full Linked Orders Report.    11/30/23 0657 11/30/23 0655  sodium chloride 0.9 % flush 10 mL  As Needed,   Status:  Discontinued         11/30/23 0657 11/30/23 0655  sodium chloride 0.9 % infusion 40 mL  As Needed,   Status:  Discontinued         11/30/23  0657    11/30/23 0655  lidocaine PF 1% (XYLOCAINE) injection 0.5 mL  Once As Needed,   Status:  Discontinued         11/30/23 0657 11/30/23 0655  acetaminophen (TYLENOL) tablet 650 mg  Every 4 Hours PRN,   Status:  Discontinued         11/30/23 0657 11/30/23 0635  CBC (No Diff)  STAT         11/30/23 0634 11/30/23 0635  Preeclampsia Panel  STAT         11/30/23 0634 11/30/23 0635  Type & Screen  STAT         11/30/23 0634    Unscheduled  Position change  As Needed      Comments: For intra-uterine resuscitation for hypertonus, hypertstimulation, or non-reassuring fetal status    11/30/23 0657    Unscheduled  Up with Assistance  As Needed       11/30/23 0657    Unscheduled  Apply Ice to Perineum  As Needed       11/30/23 0657    Unscheduled  Bladder Assessment  As Needed       11/30/23 0657    Unscheduled  Start IV with #16 or #18 gauge angiocath.  As Needed       11/30/23 0838    --  amoxicillin (AMOXIL) 500 MG capsule  2 Times Daily         11/30/23 0639    Signed and Held  Code Status and Medical Interventions:  Continuous         Signed and Held    Signed and Held  Vital Signs Per Hospital Policy  Per Hospital Policy         Signed and Held    Signed and Held  Notify Physician  Until Discontinued         Signed and Held    Signed and Held  Up Ad Chio  Until Discontinued         Signed and Held    Signed and Held  Ambulate Patient  Every Shift       Signed and Held    Signed and Held  Advance Diet As Tolerated -Regular  Until Discontinued         Signed and Held    Signed and Held  Fundal and Lochia Check  Per Order Details        Comments: Every 30 minutes x2, then Every Shift    Signed and Held    Signed and Held  RN to Assess Rh Status & Place RhIG Evaluation Order if Indicated  Continuous         Signed and Held    Signed and Held  Bladder Assessment  Per Order Details        Comments: Postpartum 1) Upon Admission to Unit & Every 4 Hours PRN Until Voiding. 2) Out of Bed to Void in 8 Hours.    Signed  and Held    Signed and Held  Straight Cath  Per Order Details        Comments: Postpartum: If Distended & Unable to Void, May Repeat Once.    Signed and Held    Signed and Held  Indwelling Urinary Catheter  Per Order Details        Comments: Postpartum : After Straight Cathed x2 or if Greater Than 1000mL Residual, Insert Indwelling Urinary Catheter Until Further MD Order.    Signed and Held    Signed and Held  Apply Ice to Perineum  Per Order Details        Comments: For 20 Minutes Every 2 Hours    Signed and Held    Signed and Held  Waffle Cushion  Per Order Details        Comments: For Perineal Discomfort    Signed and Held    Signed and Held  Donut Ring  Per Order Details        Comments: For Perineal Pain    Signed and Held    Signed and Held  Sitz Bath  3 Times Daily        Comments: PRN    Signed and Held    Signed and Held  Kpad  Per Order Details        Comments: For Pain    Signed and Held    Signed and Held  Warm compress  As Needed         Signed and Held    Signed and Held  Breast pump to bed  Once         Signed and Held    Signed and Held  Apply ace wrap, tight bra, or binder  As Needed         Signed and Held    Signed and Held  Apply ice packs  As Needed         Signed and Held    Signed and Held  If indicated -- Please administer RH Immunoglobulin based on results of cord blood evaluation and fetal screen lab tests, pharmacy to dispense  Per Order Details        Comments: See Process Instructions For Reference Range Details.    Signed and Held    Signed and Held  CBC & Differential  Morning Draw        Comments: Postpartum Day 1      Signed and Held    Signed and Held  Hemoglobin & Hematocrit, Blood  Morning Draw        Comments: Postpartum Day 1      Signed and Held    Signed and Held  sodium chloride 0.9 % flush 1-10 mL  As Needed         Signed and Held    Signed and Held  oxytocin (PITOCIN) 30 units in 0.9% sodium chloride 500 mL (premix)  Continuous PRN         Signed and Held    Signed and  Held  ibuprofen (ADVIL,MOTRIN) tablet 600 mg  Every 6 Hours Scheduled         Signed and Held    Signed and Held  acetaminophen (TYLENOL) tablet 650 mg  Every 6 Hours PRN         Signed and Held    Signed and Held  HYDROcodone-acetaminophen (NORCO) 5-325 MG per tablet 1 tablet  Every 8 Hours PRN        See Hyperspace for full Linked Orders Report.    Signed and Held    Signed and Held  bisacodyl (DULCOLAX) suppository 10 mg  Daily PRN         Signed and Held    Signed and Held  magnesium hydroxide (MILK OF MAGNESIA) suspension 10 mL  Daily PRN         Signed and Held    Signed and Held  docusate sodium (COLACE) capsule 100 mg  2 Times Daily         Signed and Held    Signed and Held  lanolin topical 1 application   Every 1 Hour PRN         Signed and Held    Signed and Held  benzocaine-menthol (DERMOPLAST) 20-0.5 % topical spray  As Needed         Signed and Held    Signed and Held  witch hazel-glycerin (TUCKS) pad  As Needed         Signed and Held    Signed and Held  Hydrocortisone (Perianal) (ANUSOL-HC) 2.5 % rectal cream 1 application   As Needed         Signed and Held    Signed and Held  benzocaine (AMERICAINE) 20 % rectal ointment 1 application   As Needed         Signed and Held    Signed and Held  HYDROcodone-acetaminophen (NORCO) 5-325 MG per tablet 2 tablet  Every 6 Hours PRN        See Hyperspace for full Linked Orders Report.    Signed and Held                  Operative/Procedure Notes (last 24 hours)  Notes from 11/29/23 1404 through 11/30/23 1404   No notes of this type exist for this encounter.       Physician Progress Notes (last 24 hours)  Notes from 11/29/23 1404 through 11/30/23 1404   No notes of this type exist for this encounter.

## 2023-11-30 NOTE — ANESTHESIA PROCEDURE NOTES
Labor Epidural      Patient reassessed immediately prior to procedure    Patient location during procedure: floor  Performed By  CRNA/RICH: Kathi Pitts CRNA  Preanesthetic Checklist  Completed: patient identified, IV checked, site marked, risks and benefits discussed, surgical consent, monitors and equipment checked, pre-op evaluation and timeout performed  Prep:  Pt Position:sitting  Sterile Tech:cap, gloves, mask and sterile barrier  Prep:DuraPrep  Monitoring:blood pressure monitoring  Epidural Block Procedure:  Approach:midline  Guidance:landmark technique and palpation technique  Location:L3-L4  Needle Type:Tuohy  Needle Gauge:17 G  Loss of Resistance Medium: air  Loss of Resistance: 5cm  Cath Depth at skin:10 cm  Paresthesia: none  Aspiration:negative  Test Dose:negative  Number of Attempts: 1  Post Assessment:  Dressing:occlusive dressing applied and secured with tape  Pt Tolerance:patient tolerated the procedure well with no apparent complications  Complications:no

## 2023-11-30 NOTE — H&P
[facility]  Obstetric History and Physical    Chief Complaint   Patient presents with    Contractions       Subjective     Patient is a 23 y.o. female  currently at 38w5d, who presents in active labor.    Her prenatal care is benign.  Her previous obstetric/gynecological history is noted for is remarkable for previous TSVD.    The following portions of the patients history were reviewed and updated as appropriate: current medications, allergies, past medical history, past surgical history, past family history, past social history, and problem list .       Prenatal Information:  Prenatal Results       Initial Prenatal Labs       Test Value Reference Range Date Time    Hemoglobin  12.3 g/dL 11.1 - 15.9 23 0926    Hematocrit  38.2 % 34.0 - 46.6 23 0926    Platelets  298 x10E3/uL 150 - 450 23 0926    Rubella IgG  <0.90 index Immune >0.99 23 09    Hepatitis B SAg  Negative  Negative 23 09    Hepatitis C Ab  Non Reactive  Non Reactive 23 0926    RPR  Non Reactive  Non Reactive 23 0926    T. Pallidum Ab         ABO  A   23 0648    Rh  Positive   23 0648    Antibody Screen  Negative  Negative 23 0926    HIV  Non Reactive  Non Reactive 23 0926    Urine Culture  Final report   23        Final report   23 0926    Gonorrhea  Negative  Negative 23 0926    Chlamydia  Negative  Negative 23 0926    TSH        HgB A1c         Varicella IgG        HgB Electrophoresis         Cystic fibrosis                   Fetal testing        Test Value Reference Range Date Time    NIPT        MSAFP        AFP-4                  2nd and 3rd Trimester       Test Value Reference Range Date Time    Hemoglobin (repeated)  11.3 g/dL 12.0 - 15.9 23 0648       11.8 g/dL 12.0 - 15.9 23 1147    Hematocrit (repeated)  35.1 % 34.0 - 46.6 23 0648       35.1 % 34.0 - 46.6 23 1147    Platelets   196 10*3/mm3 140 - 450 23 0648        202 10*3/mm3 140 - 450 09/07/23 1147       298 x10E3/uL 150 - 450 04/17/23 0926    GCT  116 mg/dL 65 - 139 09/07/23 1147    Antibody Screen (repeated)  Negative   11/30/23 0648       Negative  Negative 09/07/23 1147       Negative  Negative 04/17/23 0926    Third Trimester syphilis screen (repeated)   Non Reactive  Non Reactive 04/17/23 0926    GTT Fasting        GTT 1 Hr        GTT 2 Hr        GTT 3 Hr        Group B Strep  No Group B Streptococcus isolated   11/23/23 1323              Other testing        Test Value Reference Range Date Time    Parvo IgG         CMV IgG                   Drug Screening       Test Value Reference Range Date Time    Amphetamine Screen  Negative ng/mL Stlenu=9839 04/17/23 0926    Barbiturate Screen  Negative ng/mL Bwqswn=464 04/17/23 0926    Benzodiazepine Screen  Negative ng/mL Ndmlch=679 04/17/23 0926    Methadone Screen  Negative ng/mL Tvzmty=146 04/17/23 0926    Phencyclidine Screen  Negative ng/mL Cutoff=25 04/17/23 0926    Opiates Screen  Negative ng/mL Shdfza=471 04/17/23 0926    THC Screen  Negative ng/mL Cutoff=20 04/17/23 0926    Cocaine Screen  Negative ng/mL Vjbcaw=103 04/17/23 0926    Propoxyphene Screen  Negative ng/mL Qcgrcn=059 04/17/23 0926    Buprenorphine Screen        Methamphetamine Screen        Oxycodone Screen        Tricyclic Antidepressants Screen                  Legend    ^: Historical                          External Prenatal Results       Pregnancy Outside Results - Transcribed From Office Records - See Scanned Records For Details       Test Value Date Time    ABO  A  11/30/23 0648    Rh  Positive  11/30/23 0648    Antibody Screen  Negative  11/30/23 0648       Negative  09/07/23 1147       Negative  04/17/23 0926    Varicella IgG       Rubella  <0.90 index 04/17/23 0926    Hgb  11.3 g/dL 11/30/23 0648       11.8 g/dL 09/07/23 1147       12.3 g/dL 04/17/23 0926    Hct  35.1 % 11/30/23 0648       35.1 % 09/07/23 1147       38.2 % 04/17/23 0926     Glucose Fasting GTT       Glucose Tolerance Test 1 hour       Glucose Tolerance Test 3 hour       Gonorrhea (discrete)  Negative  23    Chlamydia (discrete)  Negative  23    RPR  Non Reactive  23    VDRL       Syphilis Antibody       HBsAg  Negative  23    Herpes Simplex Virus PCR       Herpes Simplex VIrus Culture       HIV  Non Reactive  23    Hep C RNA Quant PCR       Hep C Antibody  Non Reactive  23    AFP       Group B Strep  No Group B Streptococcus isolated  23 1323    GBS Susceptibility to Clindamycin       GBS Susceptibility to Erythromycin       Fetal Fibronectin       Genetic Testing, Maternal Blood                 Drug Screening       Test Value Date Time    Urine Drug Screen       Amphetamine Screen  Negative ng/mL 23    Barbiturate Screen  Negative ng/mL 23    Benzodiazepine Screen  Negative ng/mL 23    Methadone Screen  Negative ng/mL 23    Phencyclidine Screen  Negative ng/mL 23    Opiates Screen       THC Screen  PRESUMPTIVE POSITIVE  16    Cocaine Screen       Propoxyphene Screen  Negative ng/mL 23    Buprenorphine Screen ^ <10 ng/mL 01/10/22 1500      ^ <50 ng/mL 01/10/22 1500    Methamphetamine Screen       Oxycodone Screen       Tricyclic Antidepressants Screen  Negative  16              Legend    ^: Historical                             Past OB History:     OB History    Para Term  AB Living   2 1 1 0 0 1   SAB IAB Ectopic Molar Multiple Live Births   0 0 0 0 0 1      # Outcome Date GA Lbr Jay/2nd Weight Sex Delivery Anes PTL Lv   2 Current            1 Term 08/10/22 39w6d 13:21 :19 2850 g (6 lb 4.5 oz) F Vag-Spont EPI N LEXY      Name: Alyssa      Apgar1: 8  Apgar5: 9      Obstetric Comments    - Nova       Past Medical History: Past Medical History:   Diagnosis Date    Childhood asthma 2007    Depression with  anxiety 2016      Past Surgical History Past Surgical History:   Procedure Laterality Date    TONSILLECTOMY  2017    due to abscess    WISDOM TOOTH EXTRACTION  2020      Family History: Family History   Problem Relation Age of Onset    Skin cancer Mother     Autism Brother     Bipolar disorder Brother     Psychosis Brother       Social History:  reports that she has been smoking electronic cigarette. She uses smokeless tobacco.   reports that she does not currently use alcohol.   reports no history of drug use.        Review of Systems:    All other systems reviewed and negative    Objective     Vital Signs Range for the last 24 hours  Temperature: Temp:  [97.6 °F (36.4 °C)-97.9 °F (36.6 °C)] 97.8 °F (36.6 °C)   Temp Source: Temp src: Oral   BP: BP: (112-128)/(55-71) 114/58   Pulse: Heart Rate:  [] 75   Respirations: Resp:  [18] 18   SPO2:     O2 Amount (l/min):     O2 Devices Device (Oxygen Therapy): room air   Weight: Weight:  [100 kg (221 lb)] 100 kg (221 lb)     Physical Examination: General appearance - alert, well appearing, and in no distress  Neck - supple, no significant adenopathy  Abdomen - soft, nontender, nondistended, no masses or organomegaly  Pelvic - normal external genitalia, vulva, vagina, cervix, uterus and adnexa  Musculoskeletal - no joint tenderness, deformity or swelling  Extremities - peripheral pulses normal, no pedal edema, no clubbing or cyanosis  Skin - normal coloration and turgor, no rashes, no suspicious skin lesions noted    Presentation: vertex   Cervix: Exam by:  MD   Dilation:  5   Effacement:  90   Station:  -1     Fetal Heart Rate Assessment   Method:     Beats/min:     Baseline:     Varibility:     Accels:     Decels:     Tracing Category:  Category 1     Uterine Assessment   Method:     Frequency (min):     Ctx Count in 10 min:     Duration:     Intensity:     Intensity by IUPC:     Resting Tone:     Resting Tone by IUPC:     Bethany Units:  Ctx's q~4min      Laboratory Results: GBS neg    Assessment & Plan       Normal labor      Assessment & Plan    Assessment:  1.  Intrauterine pregnancy at 38w5d weeks gestation with reassuring fetal status.    2.  labor  without ROM      Plan:  1. fetal and uterine monitoring  continuously, expectant management, and analgesia with  epidural prn  2. Plan of care has been reviewed with patient and she wishes to wait for her mother to arrive prior to considering augmentation.  She is here but with 3yo and need childcare.  3.  Risks, benefits of treatment plan have been discussed.  4.  All questions have been answered.        Itzel Alvarez MD  11/30/2023  08:30

## 2023-12-01 LAB
BASOPHILS # BLD AUTO: 0.04 10*3/MM3 (ref 0–0.2)
BASOPHILS NFR BLD AUTO: 0.4 % (ref 0–1.5)
DEPRECATED RDW RBC AUTO: 40.4 FL (ref 37–54)
EOSINOPHIL # BLD AUTO: 0.07 10*3/MM3 (ref 0–0.4)
EOSINOPHIL NFR BLD AUTO: 0.6 % (ref 0.3–6.2)
ERYTHROCYTE [DISTWIDTH] IN BLOOD BY AUTOMATED COUNT: 14.4 % (ref 12.3–15.4)
HCT VFR BLD AUTO: 30.6 % (ref 34–46.6)
HGB BLD-MCNC: 9.8 G/DL (ref 12–15.9)
IMM GRANULOCYTES # BLD AUTO: 0.05 10*3/MM3 (ref 0–0.05)
IMM GRANULOCYTES NFR BLD AUTO: 0.4 % (ref 0–0.5)
LYMPHOCYTES # BLD AUTO: 3.26 10*3/MM3 (ref 0.7–3.1)
LYMPHOCYTES NFR BLD AUTO: 28.8 % (ref 19.6–45.3)
MCH RBC QN AUTO: 25.1 PG (ref 26.6–33)
MCHC RBC AUTO-ENTMCNC: 32 G/DL (ref 31.5–35.7)
MCV RBC AUTO: 78.3 FL (ref 79–97)
MONOCYTES # BLD AUTO: 1 10*3/MM3 (ref 0.1–0.9)
MONOCYTES NFR BLD AUTO: 8.8 % (ref 5–12)
NEUTROPHILS NFR BLD AUTO: 6.9 10*3/MM3 (ref 1.7–7)
NEUTROPHILS NFR BLD AUTO: 61 % (ref 42.7–76)
NRBC BLD AUTO-RTO: 0 /100 WBC (ref 0–0.2)
PLATELET # BLD AUTO: 180 10*3/MM3 (ref 140–450)
PMV BLD AUTO: 10.1 FL (ref 6–12)
RBC # BLD AUTO: 3.91 10*6/MM3 (ref 3.77–5.28)
WBC NRBC COR # BLD AUTO: 11.32 10*3/MM3 (ref 3.4–10.8)

## 2023-12-01 PROCEDURE — 85025 COMPLETE CBC W/AUTO DIFF WBC: CPT | Performed by: OBSTETRICS & GYNECOLOGY

## 2023-12-01 RX ORDER — CALCIUM CARBONATE 500 MG/1
2 TABLET, CHEWABLE ORAL 3 TIMES DAILY PRN
Status: DISCONTINUED | OUTPATIENT
Start: 2023-12-01 | End: 2023-12-02 | Stop reason: HOSPADM

## 2023-12-01 RX ADMIN — IBUPROFEN 600 MG: 600 TABLET, FILM COATED ORAL at 05:53

## 2023-12-01 RX ADMIN — IBUPROFEN 600 MG: 600 TABLET, FILM COATED ORAL at 18:07

## 2023-12-01 RX ADMIN — DOCUSATE SODIUM 100 MG: 100 CAPSULE, LIQUID FILLED ORAL at 20:00

## 2023-12-01 RX ADMIN — CALCIUM CARBONATE (ANTACID) CHEW TAB 500 MG 2 TABLET: 500 CHEW TAB at 04:30

## 2023-12-01 RX ADMIN — IBUPROFEN 600 MG: 600 TABLET, FILM COATED ORAL at 12:11

## 2023-12-01 RX ADMIN — Medication 1 APPLICATION: at 13:39

## 2023-12-01 RX ADMIN — DOCUSATE SODIUM 100 MG: 100 CAPSULE, LIQUID FILLED ORAL at 08:34

## 2023-12-01 RX ADMIN — IBUPROFEN 600 MG: 600 TABLET, FILM COATED ORAL at 23:24

## 2023-12-01 RX ADMIN — CETIRIZINE HYDROCHLORIDE 10 MG: 10 TABLET, FILM COATED ORAL at 20:00

## 2023-12-01 NOTE — ANESTHESIA POSTPROCEDURE EVALUATION
Patient: Jenny Brunson    Procedure Summary       Date: 11/30/23 Room / Location:     Anesthesia Start: 1027 Anesthesia Stop: 1133    Procedure: LABOR ANALGESIA Diagnosis:     Scheduled Providers:  Provider: Bj Aj DO    Anesthesia Type: epidural ASA Status: 2            Anesthesia Type: epidural    Vitals  Vitals Value Taken Time   /79 12/01/23 0749   Temp 98.2 °F (36.8 °C) 12/01/23 0749   Pulse 87 12/01/23 0749   Resp 18 12/01/23 0749   SpO2             Post Anesthesia Care and Evaluation    Patient location during evaluation: bedside  Patient participation: complete - patient participated  Level of consciousness: awake and alert  Pain management: adequate    Airway patency: patent  Anesthetic complications: No anesthetic complications    Cardiovascular status: acceptable  Respiratory status: acceptable  Hydration status: acceptable  Post Neuraxial Block status: Motor and sensory function returned to baseline and No signs or symptoms of PDPH

## 2023-12-01 NOTE — LACTATION NOTE
Courtesy follow up visit. MOB with questions about typical pump volumes at this time. Reviewed normal pump volumes and encouraged her to continue pumping anytime baby gets a supplement. Denies any further questions or needs at this time. Encouraged to call as needs arise.

## 2023-12-01 NOTE — PLAN OF CARE
Goal Outcome Evaluation:  Plan of Care Reviewed With: patient        Progress: improving  Outcome Evaluation: VSS; pain controlled with Motrin; formula feeding and pumping; lochia WNL.

## 2023-12-01 NOTE — PROGRESS NOTES
12/1/2023  PPD #1    Subjective   Jenny feels well.  Patient describes her lochia less than menses.  Pain is well controlled       Objective   Temp: Temp:  [97.9 °F (36.6 °C)-98.9 °F (37.2 °C)] 98.2 °F (36.8 °C) Temp src: Oral   BP: BP: ()/(50-79) 138/79        Pulse: Heart Rate:  [] 87  RR: Resp:  [16-18] 18    General:  No acute distress   Abdomen: Fundus firm and beneath umbilicus   Pelvis: deferred     Lab Results   Component Value Date    WBC 11.32 (H) 12/01/2023    HGB 9.8 (L) 12/01/2023    HCT 30.6 (L) 12/01/2023    MCV 78.3 (L) 12/01/2023     12/01/2023    ABORH A Positive 01/10/2022    HEPBSAG Negative 04/17/2023       Assessment  PPD# 1 after vaginal delivery, doing well  Hemodynamically stable    Plan  Routine postpartum care.      This note has been electronically signed.    Luz Rodriguez, APRN  December 1, 2023

## 2023-12-02 VITALS
TEMPERATURE: 97.9 F | WEIGHT: 221 LBS | RESPIRATION RATE: 16 BRPM | BODY MASS INDEX: 32.73 KG/M2 | SYSTOLIC BLOOD PRESSURE: 128 MMHG | DIASTOLIC BLOOD PRESSURE: 74 MMHG | HEIGHT: 69 IN | HEART RATE: 83 BPM

## 2023-12-02 PROBLEM — Z37.9 NORMAL LABOR: Status: RESOLVED | Noted: 2023-11-30 | Resolved: 2023-12-02

## 2023-12-02 PROBLEM — Z34.93 THIRD TRIMESTER PREGNANCY: Status: RESOLVED | Noted: 2023-11-23 | Resolved: 2023-12-02

## 2023-12-02 PROBLEM — Z34.90 PREGNANCY: Status: RESOLVED | Noted: 2022-07-13 | Resolved: 2023-12-02

## 2023-12-02 PROBLEM — A05.9 FOOD POISONING: Status: RESOLVED | Noted: 2023-09-25 | Resolved: 2023-12-02

## 2023-12-02 PROCEDURE — 99238 HOSP IP/OBS DSCHRG MGMT 30/<: CPT | Performed by: OBSTETRICS & GYNECOLOGY

## 2023-12-02 RX ORDER — FERROUS SULFATE 325(65) MG
325 TABLET ORAL 2 TIMES DAILY WITH MEALS
Status: DISCONTINUED | OUTPATIENT
Start: 2023-12-02 | End: 2023-12-02 | Stop reason: HOSPADM

## 2023-12-02 RX ORDER — FERROUS SULFATE 325(65) MG
325 TABLET ORAL 2 TIMES DAILY WITH MEALS
Qty: 60 TABLET | Refills: 1 | Status: SHIPPED | OUTPATIENT
Start: 2023-12-02

## 2023-12-02 RX ORDER — IBUPROFEN 600 MG/1
600 TABLET ORAL EVERY 6 HOURS PRN
Qty: 60 TABLET | Refills: 1 | Status: SHIPPED | OUTPATIENT
Start: 2023-12-02

## 2023-12-02 RX ADMIN — Medication 1 APPLICATION: at 05:45

## 2023-12-02 RX ADMIN — IBUPROFEN 600 MG: 600 TABLET, FILM COATED ORAL at 05:13

## 2023-12-02 RX ADMIN — WITCH HAZEL 1 PAD: 500 SOLUTION RECTAL; TOPICAL at 05:45

## 2023-12-02 RX ADMIN — Medication: at 05:45

## 2023-12-02 RX ADMIN — DOCUSATE SODIUM 100 MG: 100 CAPSULE, LIQUID FILLED ORAL at 08:14

## 2023-12-02 RX ADMIN — FERROUS SULFATE TAB 325 MG (65 MG ELEMENTAL FE) 325 MG: 325 (65 FE) TAB at 12:03

## 2023-12-02 RX ADMIN — IBUPROFEN 600 MG: 600 TABLET, FILM COATED ORAL at 12:03

## 2023-12-02 NOTE — DISCHARGE INSTR - APPOINTMENTS
Please call Dr. Alvarez's office on Monday to schedule your 6-week postpartum appointment.  Thank you!

## 2023-12-02 NOTE — DISCHARGE SUMMARY
Discharge Summary     Alejandra Brunson  : 2000  MRN: [id number]  CSN: [id number]    Date of Admission: 2023   Date of Discharge:  2023   Delivering Physician:         Admission Diagnosis: Normal labor [O80, Z37.9]   Discharge Diagnosis: Same as above plus  Pregnancy at 38w5d - delivered  Postpartum anemia       Procedures: 2023  - Vaginal, Spontaneous       Hospital Course  Patient is a 23 y.o.  who at 38w5d had a vaginal birth.  Her postpartum course was without complications.  On PPD # 2 she was ready for discharge.  She had normal lochia and pain well controlled with oral medications.    Infant  female  fetus weighing 2845 g (6 lb 4.4 oz)   Apgars -  8 @ 1 minute /  9 @ 5 minutes.    Discharge labs  Lab Results   Component Value Date    WBC 11.32 (H) 2023    HGB 9.8 (L) 2023    HCT 30.6 (L) 2023     2023       Discharge Medications     Discharge Medications        New Medications        Instructions Start Date   ferrous sulfate 325 (65 FE) MG tablet   325 mg, Oral, 2 Times Daily With Meals      ibuprofen 600 MG tablet  Commonly known as: ADVIL,MOTRIN   600 mg, Oral, Every 6 Hours PRN             Continue These Medications        Instructions Start Date   loratadine 10 MG tablet  Commonly known as: Claritin   10 mg, Oral, Daily      ondansetron 4 MG tablet  Commonly known as: Zofran   8 mg, Oral, Every 8 Hours PRN      Prenatal 27-1 27-1 MG tablet tablet   1 tablet, Oral, Daily             ASK your doctor about these medications        Instructions Start Date   amoxicillin 500 MG capsule  Commonly known as: AMOXIL   1,000 mg, Oral, 2 Times Daily               Discharge Disposition Home or Self Care   Condition on Discharge: good   Follow-up: 6 weeks with  Dr Antonio Woodall CNM  2023

## 2023-12-02 NOTE — PROGRESS NOTES
Alejandra Brunson  : 2000  MRN: [id number]  CSN: [id number]    Postpartum Day #2  Subjective   Her pain is well controlled. Vaginal bleeding is normal in amount. She is ambulating without difficulty. She is passing gas. She is voiding without difficulty. Her baby is doing well..     Objective     Min/max vitals past 24 hours:   Temp  Min: 97.9 °F (36.6 °C)  Max: 98.8 °F (37.1 °C)  BP  Min: 112/63  Max: 128/74  Pulse  Min: 78  Max: 83  Resp  Min: 16  Max: 16        General: well developed; well nourished  no acute distress   Abdomen: soft, non-tender; no masses  no umbilical or inguinal hernias are present  no hepato-splenomegaly   Pelvic: Not performed   Ext: Calves NT     Lab Results   Component Value Date    WBC 11.32 (H) 2023    HGB 9.8 (L) 2023    HCT 30.6 (L) 2023    MCV 78.3 (L) 2023     2023    ABORH A Positive 01/10/2022    RH Positive 2023    HEPBSAG Negative 2023        Assessment   Postpartum Day #2 S/P vaginal delivery  Doing well  Rubella non immune-declines vaccine  anemia     Plan   Discharge to home  Advised no tampons or intercourse for 6 weeks.  D/C questions all answered  Follow-up appointment in 6 week(s)  Continue po iron supplement for at least 6 weeks    Nataliia Woodall CNM  2023  13:15 EST

## 2024-01-10 ENCOUNTER — POSTPARTUM VISIT (OUTPATIENT)
Dept: OBSTETRICS AND GYNECOLOGY | Facility: CLINIC | Age: 24
End: 2024-01-10
Payer: MEDICAID

## 2024-01-10 VITALS
HEIGHT: 69 IN | BODY MASS INDEX: 32.61 KG/M2 | WEIGHT: 220.2 LBS | SYSTOLIC BLOOD PRESSURE: 124 MMHG | DIASTOLIC BLOOD PRESSURE: 84 MMHG

## 2024-01-10 RX ORDER — CITALOPRAM 20 MG/1
20 TABLET ORAL DAILY
Qty: 30 TABLET | Refills: 2 | Status: SHIPPED | OUTPATIENT
Start: 2024-01-10 | End: 2025-01-09

## 2024-01-10 RX ORDER — PREDNISONE 10 MG/1
TABLET ORAL
COMMUNITY
Start: 2024-01-04

## 2024-01-10 RX ORDER — HYDROXYZINE HYDROCHLORIDE 25 MG/1
25 TABLET, FILM COATED ORAL 3 TIMES DAILY PRN
Qty: 30 TABLET | Refills: 1 | Status: SHIPPED | OUTPATIENT
Start: 2024-01-10

## 2024-01-10 RX ORDER — DEXTROMETHORPHAN HYDROBROMIDE AND PROMETHAZINE HYDROCHLORIDE 15; 6.25 MG/5ML; MG/5ML
SYRUP ORAL
COMMUNITY
Start: 2024-01-04

## 2024-01-10 NOTE — PROGRESS NOTES
Chief Complaint   Patient presents with    Postpartum Care     6 weeks       Postpartum Visit         Jenny Brunson is a 23 y.o.  who presents today for a 6 week(s) postpartum check.     C/S: no     Vaginal, Spontaneous    Information for the patient's :  Brielle Collins [[id number]]   2023   female   Brielle Collins   2845 g (6 lb 4.4 oz)   Gestational Age: 38w5d        Baby Discharged: Discharged with Mom  Delivering Physician: Nikki Saeed MD    At the time of delivery were you diagnosed with any of the following: None. The patient did not have a laceration or episiotomy . Patient describes vaginal bleeding as absent prior to starting her period on 24. Patient is breast and bottle feeding.  She denies need for contraception because partner is a transgender.  Patient denies bowel or bladder issues.      Patient reports some postpartum depression. Patient states that she has a lot of anxiety. Postpartum Depression Screening Questionnaire: 9, patient would like to discuss options for treatment.      Last Pap : never. Results: N/A. HPV:  N/A . Patient would like to wait on pap smear until next visit.   Last Completed Pap Smear       This patient has no relevant Health Maintenance data.              The additional following portions of the patient's history were reviewed and updated as appropriate: allergies and current medications.    Review of Systems   Constitutional: Negative.    HENT: Negative.     Eyes: Negative.    Respiratory: Negative.     Cardiovascular: Negative.    Gastrointestinal: Negative.    Endocrine: Negative.    Genitourinary: Negative.    Musculoskeletal: Negative.    Skin: Negative.    Allergic/Immunologic: Negative.    Neurological: Negative.    Hematological: Negative.    Psychiatric/Behavioral:  Depressed mood: postpartum depression. The patient is nervous/anxious.      All other systems reviewed and are negative.     I have reviewed and agree  "with the HPI, ROS, and historical information as entered above. Itzel Alvarez MD      /84   Ht 175.3 cm (69\")   Wt 99.9 kg (220 lb 3.2 oz)   LMP 01/06/2024 (Exact Date)   Breastfeeding Yes   BMI 32.52 kg/m²     Physical Exam  PHYSICAL EXAM:    Abdomen: +BS, benign, no masses, soft, non-tender.  Incision: no  Bimanual exam: declines  Extremities: No deep calf tenderness.       Assessment and Plan    Problem List Items Addressed This Visit       Postpartum care and examination - Primary    Relevant Orders    Ambulatory Referral to Behavioral Health       S/p Vaginal delivery, 6 week(s) postpartum.  Doing well.    Return to normal physical activity.  No pelvic restrictions.   Baby doing well.  Bottlefeeding going well.  Signs of postpartum depression - discussed options.  Encouraged consideration of counseling and encouraged to consider SSRI.  Contraception: contraceptive methods: None  Return for Annual physical, Appropriate for followup with NP as desired..     Itzel Alvarez MD  01/10/2024  "

## 2024-01-29 ENCOUNTER — OFFICE VISIT (OUTPATIENT)
Age: 24
End: 2024-01-29
Payer: MEDICAID

## 2024-01-29 VITALS
DIASTOLIC BLOOD PRESSURE: 82 MMHG | SYSTOLIC BLOOD PRESSURE: 126 MMHG | HEART RATE: 74 BPM | WEIGHT: 220.8 LBS | HEIGHT: 69 IN | BODY MASS INDEX: 32.7 KG/M2 | OXYGEN SATURATION: 98 %

## 2024-01-29 DIAGNOSIS — F41.1 GENERALIZED ANXIETY DISORDER: Primary | ICD-10-CM

## 2024-01-29 DIAGNOSIS — F33.1 MODERATE EPISODE OF RECURRENT MAJOR DEPRESSIVE DISORDER: ICD-10-CM

## 2024-01-29 DIAGNOSIS — Z51.81 ENCOUNTER FOR THERAPEUTIC DRUG MONITORING: ICD-10-CM

## 2024-01-29 NOTE — PROGRESS NOTES
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 improvement in symptoms over the next 3 months.   Long-term goals: Patient will adhere to medication treatment plan and report improvement in symptoms over the next 6 months      Quality Measures:     TOBACCO USE:  Tobacco Use: High Risk (1/29/2024)    Patient History     Smoking Tobacco Use: Former     Smokeless Tobacco Use: Current     Passive Exposure: Not on file      Former smoker    I advised Jenny of the risks of tobacco use.     Follow Up:   Return in about 3 weeks (around 2/19/2024).    Frieda Maravilla [facility] [address]

## 2024-02-03 LAB
1OH-MIDAZOLAM UR QL SCN: NOT DETECTED NG/MG CREAT
6MAM UR QL SCN: NEGATIVE NG/ML
7AMINOCLONAZEPAM/CREAT UR: NOT DETECTED NG/MG CREAT
A-OH ALPRAZ/CREAT UR: NOT DETECTED NG/MG CREAT
A-OH-TRIAZOLAM/CREAT UR CFM: NOT DETECTED NG/MG CREAT
ACP UR QL CFM: NOT DETECTED
ALPRAZ/CREAT UR CFM: NOT DETECTED NG/MG CREAT
AMPHETAMINES UR QL SCN: NEGATIVE NG/ML
APAP UR QL SCN: NEGATIVE UG/ML
BARBITURATES UR QL SCN: NEGATIVE NG/ML
BENZODIAZ SCN METH UR: NEGATIVE
BUPRENORPHINE UR QL SCN: NEGATIVE
BUPRENORPHINE/CREAT UR: NOT DETECTED NG/MG CREAT
CANNABINOIDS UR QL SCN: NEGATIVE NG/ML
CARISOPRODOL UR QL: NEGATIVE NG/ML
CLONAZEPAM/CREAT UR CFM: NOT DETECTED NG/MG CREAT
COCAINE+BZE UR QL SCN: NEGATIVE NG/ML
CREAT UR-MCNC: 171 MG/DL
D-METHORPHAN UR-MCNC: NOT DETECTED NG/ML
D-METHORPHAN+LEVORPHANOL UR QL: NOT DETECTED
DESALKYLFLURAZ/CREAT UR: NOT DETECTED NG/MG CREAT
DIAZEPAM/CREAT UR: NOT DETECTED NG/MG CREAT
ETHANOL UR QL SCN: NEGATIVE G/DL
ETHANOL UR QL SCN: NEGATIVE NG/ML
FENTANYL CTO UR SCN-MCNC: NEGATIVE NG/ML
FENTANYL/CREAT UR: NOT DETECTED NG/MG CREAT
FLUNITRAZEPAM UR QL SCN: NOT DETECTED NG/MG CREAT
GABAPENTIN UR-MCNC: NEGATIVE UG/ML
HYPNOTICS UR QL SCN: NEGATIVE
KETAMINE UR QL: NOT DETECTED
LORAZEPAM/CREAT UR: NOT DETECTED NG/MG CREAT
MEPERIDINE UR QL SCN: NEGATIVE NG/ML
METHADONE UR QL SCN: NEGATIVE NG/ML
METHADONE+METAB UR QL SCN: NEGATIVE NG/ML
MIDAZOLAM/CREAT UR CFM: NOT DETECTED NG/MG CREAT
MISCELLANEOUS, UR: NEGATIVE
NORBUPRENORPHINE/CREAT UR: NOT DETECTED NG/MG CREAT
NORDIAZEPAM/CREAT UR: NOT DETECTED NG/MG CREAT
NORFENTANYL/CREAT UR: NOT DETECTED NG/MG CREAT
NORFLUNITRAZEPAM UR-MCNC: NOT DETECTED NG/MG CREAT
NORKETAMINE UR-MCNC: NOT DETECTED UG/ML
OPIATES UR SCN-MCNC: NEGATIVE NG/ML
OTHER HALLUCINOGENS UR: NEGATIVE
OXAZEPAM/CREAT UR: NOT DETECTED NG/MG CREAT
OXYCODONE CTO UR SCN-MCNC: NEGATIVE NG/ML
PCP UR QL SCN: NEGATIVE NG/ML
PRESCRIBED MEDICATIONS: NORMAL
PROPOXYPH UR QL SCN: NEGATIVE NG/ML
TAPENTADOL CTO UR SCN-MCNC: NEGATIVE NG/ML
TEMAZEPAM/CREAT UR: NOT DETECTED NG/MG CREAT
TRAMADOL UR QL SCN: NEGATIVE NG/ML
ZALEPLON UR-MCNC: NOT DETECTED NG/ML
ZOLPIDEM PHENYL-4-CARB UR QL SCN: NOT DETECTED
ZOLPIDEM UR QL SCN: NOT DETECTED
ZOPICLONE-N-OXIDE UR-MCNC: NOT DETECTED NG/ML

## 2024-02-19 ENCOUNTER — OFFICE VISIT (OUTPATIENT)
Age: 24
End: 2024-02-19
Payer: MEDICAID

## 2024-02-19 ENCOUNTER — PRIOR AUTHORIZATION (OUTPATIENT)
Age: 24
End: 2024-02-19

## 2024-02-19 VITALS
DIASTOLIC BLOOD PRESSURE: 84 MMHG | HEIGHT: 69 IN | BODY MASS INDEX: 33.44 KG/M2 | SYSTOLIC BLOOD PRESSURE: 130 MMHG | OXYGEN SATURATION: 98 % | HEART RATE: 92 BPM | WEIGHT: 225.8 LBS

## 2024-02-19 DIAGNOSIS — F41.1 GENERALIZED ANXIETY DISORDER: ICD-10-CM

## 2024-02-19 DIAGNOSIS — F33.1 MODERATE EPISODE OF RECURRENT MAJOR DEPRESSIVE DISORDER: Primary | ICD-10-CM

## 2024-02-19 RX ORDER — LURASIDONE HYDROCHLORIDE 20 MG/1
20 TABLET, FILM COATED ORAL DAILY
Qty: 30 TABLET | Refills: 0 | Status: SHIPPED | OUTPATIENT
Start: 2024-02-19

## 2024-02-19 NOTE — PROGRESS NOTES
AIMS Scale        Office  Follow Up Visit      Patient Name: Jenny Brunson  : 2000   MRN: [id number]     Referring Provider: Provider, No Known    Chief Complaint:       ICD-10-CM ICD-9-CM   1. Generalized anxiety disorder  F41.1 300.02   2. Moderate episode of recurrent major depressive disorder  F33.1 296.32        History of Present Illness:   Jenny Brunson is a 23 y.o. female who is arrives 10 minutes late today for follow up related to depression, anxiety     Patient states she is stressed because she states at home with 3 kids running around and she likes to keep her environment clean and organized. She feels vraylar has worsened depression, OCD, anger and paranoia. Feels it has improved racing thoughts and anxiety.  Feels depression wasn't too bad before starting Vraylar, but now feels her mood instability is most impairing of her symptoms. She states her Mom and BF do not feel Vraylar was working for her and have provided feedback stating such. Her PHQ score  is 16 (previously a 19).  Patient reports occasional passive SI but adamantly and convincingly denies having an active plan to harm herself or anyone else. MECCA is 17 (previously 19) .  She describes having  a lot of catasrophizing thoughts, such as what if all of a sudden her boyfriend stops loving her. Sleep disrupted due to bad dreams that keep her from sleeping. Reports she is having vivid nightmares.  Had a vivid dream that her house was on fire. Appetite is fine. Feels like she is hungrier that she used to be, but she still eats the same. Reports  being told years ago  that she has a 71 yo heart in a 17 yo after being evaluated for dizziness and syncope. She wore a holter monitor but they  never found the cause of her problem..       Diagnosis: anxiety   Medications: Celexa 20 mg , Atarax   Therapy: Appointment with Becki aTte 2024  Medications:  hydroxyzine 25 mg 3 times daily as needed, Vraylar 1.5 mg daily   Previous  medication trials: Zoloft-ineffective  time, Trazodone-stopped working , vraylar-worsening depression    Subjective      Review of Systems:   Review of Systems   Constitutional:  Positive for activity change, appetite change and fatigue.   Psychiatric/Behavioral:  Positive for agitation, behavioral problems, decreased concentration, dysphoric mood, sleep disturbance and suicidal ideas. The patient is nervous/anxious.    All other systems reviewed and are negative.      PHQ-9 Depression Screening  Little interest or pleasure in doing things? 1-->several days   Feeling down, depressed, or hopeless? 1-->several days   Trouble falling or staying asleep, or sleeping too much? 2-->more than half the days   Feeling tired or having little energy? 1-->several days   Poor appetite or overeating? 2-->more than half the days   Feeling bad about yourself - or that you are a failure or have let yourself or your family down? 2-->more than half the days   Trouble concentrating on things, such as reading the newspaper or watching television? 3-->nearly every day   Moving or speaking so slowly that other people could have noticed? Or the opposite - being so fidgety or restless that you have been moving around a lot more than usual? 3-->nearly every day   Thoughts that you would be better off dead, or of hurting yourself in some way? 1-->several days   PHQ-9 Total Score 16   If you checked off any problems, how difficult have these problems made it for you to do your work, take care of things at home, or get along with other people? very difficult     C-SSRS (Screen-Recent) Past Month     Q1 Wished to be Dead (Past Month) no   Q2 Suicidal Thoughts (Past Month) no   Q6 Suicide Behavior (Lifetime) no   Level of Risk per Screen screen negative     MECCA-7      Over the last two weeks, how often have you been bothered by the following problems?  Feeling nervous, anxious or on edge: Several days  Not being able to stop or control worrying:  More than half the days  Worrying too much about different things: Nearly every day  Trouble Relaxing: Nearly every day  Being so restless that it is hard to sit still: More than half the days  Becoming easily annoyed or irritable: Nearly every day  Feeling afraid as if something awful might happen: Nearly every day  MECCA 7 Total Score: 17  If you checked any problems, how difficult have these problems made it for you to do your work, take care of things at home, or get along with other people: Extremely difficult    Patient History:   The following portions of the patient's history were reviewed and updated as appropriate: allergies, current medications, past family history, past medical history, past social history, past surgical history and problem list.     Social History     Socioeconomic History    Marital status: Single    Number of children: 0    Highest education level: High school graduate   Tobacco Use    Smoking status: Former     Types: Electronic Cigarette    Smokeless tobacco: Current    Tobacco comments:     vapes +nicotine   Vaping Use    Vaping Use: Every day    Substances: Nicotine    Devices: Pre-filled or refillable cartridge   Substance and Sexual Activity    Alcohol use: Not Currently    Drug use: Yes     Types: Marijuana    Sexual activity: Yes     Partners: Male     Birth control/protection: None       Medications:     Current Outpatient Medications:     Cariprazine HCl (Vraylar) 1.5 MG capsule capsule, Take 1 capsule by mouth Daily., Disp: 21 capsule, Rfl: 0    citalopram (CeleXA) 20 MG tablet, Take 1 tablet by mouth Daily., Disp: 30 tablet, Rfl: 2    ferrous sulfate 325 (65 FE) MG tablet, Take 1 tablet by mouth 2 (Two) Times a Day With Meals., Disp: 60 tablet, Rfl: 1    hydrOXYzine (ATARAX) 25 MG tablet, Take 1 tablet by mouth 3 (Three) Times a Day As Needed for Anxiety., Disp: 30 tablet, Rfl: 1    ibuprofen (ADVIL,MOTRIN) 600 MG tablet, Take 1 tablet by mouth Every 6 (Six) Hours As Needed  "for Mild Pain (First Line: Mild pain.)., Disp: 60 tablet, Rfl: 1    loratadine (Claritin) 10 MG tablet, Take 1 tablet by mouth Daily., Disp: 30 tablet, Rfl: 2    predniSONE (DELTASONE) 10 MG (21) dose pack, , Disp: , Rfl:     Prenatal Vit-Fe Fumarate-FA (Prenatal 27-1) 27-1 MG tablet tablet, Take 1 tablet by mouth Daily., Disp: , Rfl:     promethazine-dextromethorphan (PROMETHAZINE-DM) 6.25-15 MG/5ML syrup, , Disp: , Rfl:     Objective     Physical Exam:  Vital Signs:   Vitals:    02/19/24 1343   BP: 130/84   Pulse: 92   SpO2: 98%   Weight: 102 kg (225 lb 12.8 oz)   Height: 175.3 cm (69.02\")     Body mass index is 33.33 kg/m².       Mental Status Exam:   MENTAL STATUS EXAM   General Appearance:  Well developed and cleanly groomed and dressed  Eye Contact:  Good eye contact  Attitude:  Cooperative  Motor Activity:  Normal gait, posture  Muscle Strength:  Normal  Speech:  Normal rate, tone, volume  Language:  Spontaneous  Mood and affect:  Depressed  Hopelessness:  Denies  Thought Process:  Logical and goal-directed  Associations/ Thought Content:  No delusions  Hallucinations:  None  Suicidal Ideations:  Not present  Homicidal Ideation:  Not present  Sensorium:  Alert and clear  Orientation:  Person, place, time and situation  Immediate Recall, Recent, and Remote Memory:  Intact  Attention Span/ Concentration:  Good  Fund of Knowledge:  Appropriate for age and educational level  Intellectual Functioning:  Average range  Insight:  Good  Judgement:  Good  Reliability:  Good  Impulse Control:  Good       @RESULASTCBCDIFFPANEL,TSH,LABLIPI,NRNXZAWK11,FXSCZNKA65,MG,FOLATE,PROLACTIN,CRPRESULT,CMP,[id number])@    Lab Results   Component Value Date    GLUCOSE 103 (H) 01/16/2023    BUN 9 01/16/2023    CREATININE 0.61 11/30/2023    EGFR 102.2 01/16/2023    BCR 10.7 01/16/2023    K 3.6 01/16/2023    CO2 25.0 01/16/2023    CALCIUM 8.7 01/16/2023    ALBUMIN 4.1 01/16/2023    BILITOT 0.2 11/30/2023    AST 12 11/30/2023    ALT 10 " "11/30/2023       Lab Results   Component Value Date    WBC 11.32 (H) 12/01/2023    HGB 9.8 (L) 12/01/2023    HCT 30.6 (L) 12/01/2023    MCV 78.3 (L) 12/01/2023     12/01/2023       No results found for: \"CHOL\", \"CHLPL\", \"TRIG\", \"HDL\", \"LDL\"      The following data was reviewed by: FRANCISCO JAVIER Rousseau on 02/19/2024:    Data reviewed : Cardiology studies EKG    Blood Pressure :   */*   mmHG  Vent. Rate :  81 BPM     Atrial Rate :  81 BPM     P-R Int : 164 ms          QRS Dur :  80 ms      QT Int : 364 ms       P-R-T Axes :  42  47   5 degrees     QTc Int : 422 ms     Sinus rhythm with marked sinus arrhythmia  Otherwise normal ECG  When compared with ECG of 15-MERLE-2023 23:09,  No significant change was found  LWBS     Referred By: EDMD           Confirmed By:     Assessment / Plan      Visit Diagnosis/Orders Placed This Visit:  Diagnoses and all orders for this visit:    1. Moderate episode of recurrent major depressive disorder (Primary)  -     Lurasidone HCl (Latuda) 20 MG tablet tablet; Take 1 tablet by mouth Daily. Take with 350 calories  Dispense: 30 tablet; Refill: 0    2. Generalized anxiety disorder  Comments:  continue hydroxyzine 25 mg tid prn     Plan:    -Therapy appt 4/2024  -DC Vraylar 1.5 mg daily- worsening depression, agitation  Start Latuda 20 mg daily with 350 calories- Zoloft and trazodone previously ineffective   Labs and aris reviewed  -No vit D, Vit b12, folate, mag, thyroid studies  or lipid available for review. Will discuss ordering then next visit.      -Nonmedicinal methods used to decrease anxiety and improve sleep were discussed at length. These include benefits of decreased caffeine consumption, utilization of a weighted blanket, avoiding screen two hours prior to sleep or using blue blocker glasses, sleeping in a dark room, avoid daytime naps,  use bed only for sleeping, and using  increasing daytime activity as permitted. Melatonin 1-5 mg HS prn or Magnesium Glycinate up " to 400 mg HS prn can be used to aid sleep.     -The benefits  of consuming a healthy diet, minimizing caffeine intake and engaging in  daily exercise were discussed with patient. Patient encouraged to consider lifestyle modification regarding diet and exercise patterns to maximize results of mental health treatment.     -Patient advised to refrain from THC use. Negative long term effects reviewed including but not limited to:  potential interruption of brain connectivity,  poor memory, impaired cognition, psychosis fall, oversedation especially when used with opioids. Use of THC may predispose patient to psychosis and increase anxiety     Continue supportive psychotherapy efforts and medications as indicated. Treatment and medication options discussed during today's visit. Patient ackowledged and verbally consented to continue with current treatment plan and was educated on the importance of compliance with treatment and follow-up appointments. Patient seems reasonably able to adhere to treatment plan.      Medication Considerations:  Discussed medication options and treatment plan of prescribed medication(s) as well as the risks, benefits, and potential side effects. Patient is agreeable to call the office with any worsening of symptoms or onset of side effects. Patient is agreeable to call 911 or go to the nearest ER should he/she begin having SI/HI.    Quality Measures:   Former smoker    I advised Jenny Brunson of the risks of tobacco use.     Follow Up:   Return in about 4 weeks (around 3/18/2024).      FRANCISCO JAVIER Jacobs, PMHNP-BC

## 2024-02-20 NOTE — TELEPHONE ENCOUNTER
Outcome  N/A today  Prior Authorization is not required for this medication dosage form and strength at the quantity and days supply requested.

## 2024-02-20 NOTE — TELEPHONE ENCOUNTER
Called PT to inform her we received a determination on her PA.  Outcome  N/A today  Prior Authorization is not required for this medication dosage form and strength at the quantity and days supply requested.    PT verbalized understanding and had no further questions. PT stated she will go to her pharmacy soon to get this Rx filled. Informed PT to call back with any future questions or concerns.

## 2024-03-15 NOTE — OUTREACH NOTE
[facility]  IP Postpartum    Questions/Answers    Flowsheet Row Responses   Best Method for Contacting Cell   Support Person Present Yes   Does the patient have a car seat at the hospital Yes   Delivery Note Reviewed Reviewed   Were birth expectations met? Yes   Is there a need for additional support/resources? No   Lactation Note Reviewed Reviewed   Is additional support needed? No   Any questions or concerns? No   Confirm initial well-child Pediatrician appointment date/time: --  [attempted to call and make appt, will call again]   Additional post-discharge F/U appointments No   Does patient have transportation to appointments? Yes   Any other assistance needed to ensure she is able to attend appointments? No   Does patient have supplies needed at home for  care? Breast Pump, Crib, Clothing, Diapers, Formula              Becki Keys RN  Maternity Nurse Navigator    2022, 09:59 EDT         Warm/Dry

## 2024-03-18 ENCOUNTER — TELEPHONE (OUTPATIENT)
Dept: OBSTETRICS AND GYNECOLOGY | Facility: CLINIC | Age: 24
End: 2024-03-18
Payer: MEDICAID

## 2024-03-18 NOTE — TELEPHONE ENCOUNTER
Caller: Jenny Brunson    Relationship: Self    Best call back number: [phone]    What is the best time to reach you: ANY    Who are you requesting to speak with (clinical staff, provider,  specific staff member): CLINICAL    Do you know the name of the person who called: PARK    What was the call regarding: PT RETURNING CALL AFTER TOOK 2ND PREG TEST - TESTED NEGATIVE    Is it okay if the provider responds through MyChart: PLEASE CALL PT TO DISCUSS

## 2024-03-18 NOTE — TELEPHONE ENCOUNTER
Caller: Jenny Brunson     Relationship: SELF     Best call back number: [phone]    What is your medical concern? HAD A POSITIVE PREG TEST 2 DAYS AGO - STARTED BLEEDING VERY HEAVILY LAST NIGHT / THIS MORNING    How long has this issue been going on? JUST TODAY    Is your provider already aware of this issue? NO    Have you been treated for this issue? NO    PLEASE CALL PT TO ADVISE/ DISCUSS

## 2024-03-18 NOTE — TELEPHONE ENCOUNTER
Called patient. She states she did UPT and it is negative now. Discussed with FRANCISCO JAVIER Pritchett. She stated may be having a period. Recommends patient monitor and call if does not resolve like a period or for heavy bleeding, passing large clots, or severe pain. Informed patient. Advised her she can still come in for HCG if she wants. She v/u and agreed. She declines HCG at this time.

## 2024-03-18 NOTE — TELEPHONE ENCOUNTER
Returned patient's call. Patient of Dr. Alvarez.   LMP 2/13/24 = 4w6d  +UPT 3/14/24 or 3/15/24  Was not using any method to prevent pregnancy.   Reports onset of bleeding yesterday; quickly became heavy like a heavy period with intense cramping in lower abdomen. Bleeding is like her normal period today with some small clots and some intermittent moderate cramping. Denies any pain localized to one side of abdomen.   MBT is A positive.   Discussed with FRANCISCO JAVIER Pritchett. She recommends patient come in for HCG or can repeat UPT at home. Stated it could have been a false positive UPT.   Informed patient. She cannot come in today; she might be able to come in tomorrow for labs. She will recheck UPT and call back today. States cramping is not bad at this time. Reviewed ectopic precautions. She v/u.

## 2024-03-31 DIAGNOSIS — F33.1 MODERATE EPISODE OF RECURRENT MAJOR DEPRESSIVE DISORDER: ICD-10-CM

## 2024-04-01 RX ORDER — LURASIDONE HYDROCHLORIDE 20 MG/1
20 TABLET, FILM COATED ORAL DAILY
Qty: 30 TABLET | Refills: 0 | Status: SHIPPED | OUTPATIENT
Start: 2024-04-01

## 2024-04-01 NOTE — TELEPHONE ENCOUNTER
Rx Refill Note  Requested Prescriptions     Pending Prescriptions Disp Refills    Lurasidone HCl (LATUDA) 20 MG tablet tablet [Pharmacy Med Name: LURASIDONE HCL 20 MG TABLET] 30 tablet 0     Sig: TAKE 1 TABLET BY MOUTH DAILY. TAKE WITH 350 CALORIES      Last office visit with prescribing clinician: 2/19/2024     Next office visit with prescribing clinician: Visit date not found     Robert Garland MA  04/01/24, 11:27 EDT

## 2024-04-02 NOTE — TELEPHONE ENCOUNTER
Contacted patient to inform that their Latuda 20 mg prescription was sent to the pharmacy on file. Pt verbalized understanding and did not have any additional questions at this time. Advised PT to call our office at [phone] with any questions or concerns.

## 2024-05-13 ENCOUNTER — TELEPHONE (OUTPATIENT)
Dept: OBSTETRICS AND GYNECOLOGY | Facility: CLINIC | Age: 24
End: 2024-05-13
Payer: MEDICAID

## 2024-05-13 NOTE — TELEPHONE ENCOUNTER
Patient of Dr. Alvarez; LOV was PP visit 01/10/2024.   Returned patient's call.   States she wants to start some form of birth control. She had Nexplanon in the past but it seemed to increase her depression. States she has never taken OCPs or had an IUD. She would like to discuss which options would be least likely to affect her mood/depression.   Appointment scheduled to discuss with provider; she v/u and agreed.

## 2024-06-10 ENCOUNTER — TELEMEDICINE (OUTPATIENT)
Dept: PSYCHIATRY | Facility: CLINIC | Age: 24
End: 2024-06-10
Payer: MEDICAID

## 2024-06-10 DIAGNOSIS — F41.1 GAD (GENERALIZED ANXIETY DISORDER): Primary | ICD-10-CM

## 2024-06-10 DIAGNOSIS — F33.1 MODERATE EPISODE OF RECURRENT MAJOR DEPRESSIVE DISORDER: ICD-10-CM

## 2024-06-10 PROCEDURE — 90791 PSYCH DIAGNOSTIC EVALUATION: CPT
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
## 2024-07-15 ENCOUNTER — TELEMEDICINE (OUTPATIENT)
Dept: PSYCHIATRY | Facility: CLINIC | Age: 24
End: 2024-07-15
Payer: MEDICAID

## 2024-07-15 DIAGNOSIS — F33.1 MODERATE EPISODE OF RECURRENT MAJOR DEPRESSIVE DISORDER: ICD-10-CM

## 2024-07-15 DIAGNOSIS — F41.1 GAD (GENERALIZED ANXIETY DISORDER): Primary | ICD-10-CM

## 2024-07-15 PROCEDURE — 90837 PSYTX W PT 60 MINUTES: CPT
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
## 2024-07-29 ENCOUNTER — TELEPHONE (OUTPATIENT)
Dept: OBSTETRICS AND GYNECOLOGY | Facility: CLINIC | Age: 24
End: 2024-07-29
Payer: MEDICAID

## 2024-07-29 ENCOUNTER — LAB (OUTPATIENT)
Dept: OBSTETRICS AND GYNECOLOGY | Facility: CLINIC | Age: 24
End: 2024-07-29
Payer: MEDICAID

## 2024-07-29 ENCOUNTER — TELEMEDICINE (OUTPATIENT)
Dept: PSYCHIATRY | Facility: CLINIC | Age: 24
End: 2024-07-29
Payer: MEDICAID

## 2024-07-29 DIAGNOSIS — Z32.00 POSSIBLE PREGNANCY, NOT CONFIRMED: Primary | ICD-10-CM

## 2024-07-29 DIAGNOSIS — F41.1 GAD (GENERALIZED ANXIETY DISORDER): Primary | ICD-10-CM

## 2024-07-29 DIAGNOSIS — F33.1 MODERATE EPISODE OF RECURRENT MAJOR DEPRESSIVE DISORDER: ICD-10-CM

## 2024-07-29 PROCEDURE — 90837 PSYTX W PT 60 MINUTES: CPT

## 2024-07-29 NOTE — PROGRESS NOTES
Date: 2024  Time In: 13:02 EDT  Time out: 1:55 PM    This provider is located at [facility], [facility] [place], [place], [facility], using a secure [employer] Video Visit through [employer]. Patient is being seen remotely via telehealth at their home address is located in [place]. Patient stated they are in a secure environment for this session. The patient's condition being diagnosed and treated is appropriate for telemedicine. The provider identified themself as well as their credentials. The patient, or  patient's legal guardian consent to be seen remotely, and when consent is given they understand that the consent allows for patient identifiable information to be sent to a third party as needed. They may refuse to be seen remotely at any time. The electronic data is encrypted and password protected, and the patient's or  legal guardian has been advised of the potential risks to privacy not withstanding such measures.   PT Identifiers used: Name and .    You have chosen to receive care through a telehealth visit.  Do you consent to use a video/audio connection for your medical care today? Yes    Subjective   Jenny Brunson is a 24 y.o. female who presents today for follow up    Chief Complaint:   Chief Complaint   Patient presents with    Anxiety    Depression        Data: Pt reported she left her partner, pt reported she is still processing this. 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 Pt reported possibly being pregnant, waiting on her blood test results. Pt reported avoiding thinking about this until results are back.      Clinical Maneuvering/Intervention: Processing, validation, CBT techniques. Assisted patient in processing above session content; acknowledged and normalized patient’s thoughts, feelings, and concerns.  Rationalized patient thought process regarding concerns presented at session.   Discussed triggers associated with patient's  anxiety  and depression  Also discussed coping skills for patient to implement such as grounding , mindfulness , and self care     Allowed patient to freely discuss issues without interruption or judgment. Provided safe, confidential environment to facilitate the development of positive therapeutic relationship and encourage open, honest communication. Assisted patient in identifying risk factors which would indicate the need for higher level of care including thoughts to harm self or others and/or self-harming behavior and encouraged patient to contact this office, call 911, or present to the nearest emergency room should any of these events occur. Discussed crisis intervention services and means to access. Patient adamantly and convincingly denies current suicidal or homicidal ideation or perceptual disturbance.    Assessment: Pt was alert and oriented x3. Pt appears under increased stress due to finances and ending her relationship. Pt appears to struggle with compartmentalizing at times, improving. Pt appears to still be processing the relationship with her children's father. Pts anxiety appears to be increased due to possibly being pregnant at this time, waiting on labs. Pt appeared receptive to techniques and encouragements discussed in session.    Patient appears to maintain relative stability as compared to their baseline.  However, patient continues to struggle with   Chief Complaint   Patient presents with    Anxiety    Depression    which continues to cause impairment in important areas of functioning.  A result, they can be reasonably expected to continue to benefit from treatment and would likely be at increased risk for decompensation otherwise.        Mental Status Exam:   Hygiene:   good  Cooperation:  Cooperative  Eye Contact:  Good  Psychomotor Behavior:  Appropriate  Affect:  Appropriate  Mood: normal  Speech:  Normal  Thought Process:  Linear  Thought  Content:  Normal  Suicidal:  None  Homicidal:  None  Hallucinations:  None  Delusion:  None  Memory:  Intact  Orientation:  Grossly intact  Reliability:  good  Insight:  Good  Judgement:  Good  Impulse Control:  Good  Physical/Medical Issues:  No        Patient's Support Network Includes:   lack of healthy support    Functional Status: No impairment    Progress toward goal: Not at goal    Prognosis: Fair with Ongoing Treatment     Plan:     Patient will continue in individual outpatient therapy with focus on improved functioning and coping skills, maintaining stability, and avoiding decompensation and the need for higher level of care.    Patient will adhere to medication regimen as prescribed and report any side effects. Patient will contact this office, call 911 or present to the nearest emergency room should suicidal or homicidal ideations occur. Provide Cognitive Behavioral Therapy and Solution Focused Therapy to improve functioning, maintain stability, and avoid decompensation and the need for higher level of care.     Return in about 3 weeks (around 8/19/2024).      VISIT DIAGNOSIS:    Diagnosis Plan   1. MECCA (generalized anxiety disorder)        2. Moderate episode of recurrent major depressive disorder         13:02 EDT         This document has been electronically signed by Shanta Siegel LCSW  July 29, 2024      Part of this note may be an electronic transcription/translation of spoken language to printed text using the Dragon Dictation System.

## 2024-07-30 LAB — HCG INTACT+B SERPL-ACNC: <1 MIU/ML

## 2024-09-24 ENCOUNTER — OFFICE VISIT (OUTPATIENT)
Dept: OBSTETRICS AND GYNECOLOGY | Facility: CLINIC | Age: 24
End: 2024-09-24
Payer: MEDICAID

## 2024-09-24 VITALS
SYSTOLIC BLOOD PRESSURE: 112 MMHG | BODY MASS INDEX: 36.43 KG/M2 | WEIGHT: 246 LBS | DIASTOLIC BLOOD PRESSURE: 68 MMHG | HEIGHT: 69 IN

## 2024-09-24 DIAGNOSIS — N91.2 AMENORRHEA: ICD-10-CM

## 2024-09-24 DIAGNOSIS — N92.6 MISSED PERIODS: Primary | ICD-10-CM

## 2024-09-24 LAB
B-HCG UR QL: NEGATIVE
EXPIRATION DATE: NORMAL
INTERNAL NEGATIVE CONTROL: NORMAL
INTERNAL POSITIVE CONTROL: NORMAL
Lab: NORMAL

## 2024-09-24 PROCEDURE — 1159F MED LIST DOCD IN RCRD: CPT

## 2024-09-24 PROCEDURE — 1160F RVW MEDS BY RX/DR IN RCRD: CPT

## 2024-09-24 PROCEDURE — 99213 OFFICE O/P EST LOW 20 MIN: CPT

## 2024-09-24 PROCEDURE — 81025 URINE PREGNANCY TEST: CPT

## 2024-09-24 RX ORDER — ALBUTEROL SULFATE 90 UG/1
AEROSOL, METERED RESPIRATORY (INHALATION)
COMMUNITY
Start: 2024-08-12 | End: 2024-09-24

## 2024-09-25 LAB
ALBUMIN SERPL-MCNC: 4.7 G/DL (ref 4–5)
ALP SERPL-CCNC: 73 IU/L (ref 44–121)
ALT SERPL-CCNC: 16 IU/L (ref 0–32)
AST SERPL-CCNC: 17 IU/L (ref 0–40)
BILIRUB SERPL-MCNC: <0.2 MG/DL (ref 0–1.2)
BUN SERPL-MCNC: 11 MG/DL (ref 6–20)
BUN/CREAT SERPL: 13 (ref 9–23)
CALCIUM SERPL-MCNC: 10.1 MG/DL (ref 8.7–10.2)
CHLORIDE SERPL-SCNC: 102 MMOL/L (ref 96–106)
CO2 SERPL-SCNC: 21 MMOL/L (ref 20–29)
CREAT SERPL-MCNC: 0.85 MG/DL (ref 0.57–1)
DHEA-S SERPL-MCNC: 321 UG/DL (ref 110–431.7)
EGFRCR SERPLBLD CKD-EPI 2021: 99 ML/MIN/1.73
GLOBULIN SER CALC-MCNC: 2.7 G/DL (ref 1.5–4.5)
GLUCOSE SERPL-MCNC: 84 MG/DL (ref 70–99)
HBA1C MFR BLD: 5.5 % (ref 4.8–5.6)
HCG INTACT+B SERPL-ACNC: <1 MIU/ML
POTASSIUM SERPL-SCNC: 4.4 MMOL/L (ref 3.5–5.2)
PROLACTIN SERPL-MCNC: 7.5 NG/ML (ref 4.8–33.4)
PROT SERPL-MCNC: 7.4 G/DL (ref 6–8.5)
SODIUM SERPL-SCNC: 140 MMOL/L (ref 134–144)
T4 FREE SERPL-MCNC: 1.22 NG/DL (ref 0.82–1.77)
TESTOST SERPL-MCNC: 28 NG/DL (ref 13–71)
TSH SERPL DL<=0.005 MIU/L-ACNC: 0.82 UIU/ML (ref 0.45–4.5)

## 2024-10-01 LAB — 17OHP SERPL-MCNC: 35 NG/DL

## 2024-10-15 ENCOUNTER — OFFICE VISIT (OUTPATIENT)
Dept: OBSTETRICS AND GYNECOLOGY | Facility: CLINIC | Age: 24
End: 2024-10-15
Payer: MEDICAID

## 2024-10-15 VITALS — DIASTOLIC BLOOD PRESSURE: 66 MMHG | SYSTOLIC BLOOD PRESSURE: 110 MMHG | HEIGHT: 69 IN | BODY MASS INDEX: 36.33 KG/M2

## 2024-10-15 DIAGNOSIS — N91.2 AMENORRHEA: Primary | ICD-10-CM

## 2024-10-15 DIAGNOSIS — Z30.09 ENCOUNTER FOR OTHER GENERAL COUNSELING OR ADVICE ON CONTRACEPTION: ICD-10-CM

## 2024-10-15 PROCEDURE — 99213 OFFICE O/P EST LOW 20 MIN: CPT

## 2024-10-15 RX ORDER — MEDROXYPROGESTERONE ACETATE 10 MG
10 TABLET ORAL DAILY
Qty: 10 TABLET | Refills: 0 | Status: SHIPPED | OUTPATIENT
Start: 2024-10-15 | End: 2024-10-25

## 2024-10-15 NOTE — PROGRESS NOTES
Chief Complaint   Patient presents with    Amenorrhea     Follow up         Subjective   HPI  Jenny Brunson is a 24 y.o. female, , her last LMP was Patient's last menstrual period was 2024 (exact date)..  She presents for a follow up evaluation of Amenorrhea. The patient was last seen  2024  ago by  FRANCISCO JAVIER Begum . At that time she reported occasional light spotting that lasts only a day since last full period in May . She had labs drawn. The plan was  return for ultrasound evaluation . Since the last visit the patient reports the plan has remained unchanged. This has been an issue in the past. The patient reports additional symptoms as none.      US was done today.       Additional OB/GYN History   Last Pap :   Last Completed Pap Smear       This patient has no relevant Health Maintenance data.          History of abnormal Pap smear: no  Tobacco Usage?: Yes Jenny Brunson  reports that she has quit smoking. Her smoking use included electronic cigarette. She uses smokeless tobacco. I have educated her on the risk of diseases from using tobacco products such as cancer, COPD, and heart disease.     I advised her to quit and she is willing to quit. We have discussed the following method/s for tobacco cessation:  Counseling.              Current Outpatient Medications:     medroxyPROGESTERone (Provera) 10 MG tablet, Take 1 tablet by mouth Daily for 10 days., Disp: 10 tablet, Rfl: 0     Past Medical History:   Diagnosis Date    Childhood asthma 2007    Depression with anxiety         Past Surgical History:   Procedure Laterality Date    TONSILLECTOMY  2017    due to abscess    WISDOM TOOTH EXTRACTION         The additional following portions of the patient's history were reviewed and updated as appropriate: allergies, current medications, past medical history, past social history, past surgical history, and problem list.    Review of Systems   Constitutional:  "Negative.    HENT: Negative.     Eyes: Negative.    Respiratory: Negative.     Cardiovascular: Negative.    Gastrointestinal: Negative.    Endocrine: Negative.    Genitourinary:  Positive for menstrual problem.   Musculoskeletal: Negative.    Skin: Negative.    Allergic/Immunologic: Negative.    Neurological: Negative.    Hematological: Negative.    Psychiatric/Behavioral: Negative.         I have reviewed and agree with the HPI, ROS, and historical information as entered above. Jaymie Joya, APRN      Objective   /66   Ht 175.3 cm (69\")   LMP 05/23/2024 (Exact Date)   BMI 36.33 kg/m²     Physical Exam  Vitals and nursing note reviewed.   Constitutional:       General: She is not in acute distress.     Appearance: Normal appearance. She is not ill-appearing, toxic-appearing or diaphoretic.   Pulmonary:      Effort: Pulmonary effort is normal.   Abdominal:      Palpations: Abdomen is soft.   Skin:     General: Skin is warm and dry.   Neurological:      Mental Status: She is alert and oriented to person, place, and time.   Psychiatric:         Mood and Affect: Mood normal.         Behavior: Behavior normal.         Thought Content: Thought content normal.         Judgment: Judgment normal.         Assessment & Plan     Assessment     Problem List Items Addressed This Visit    None  Visit Diagnoses       Amenorrhea    -  Primary    Relevant Medications    medroxyPROGESTERone (Provera) 10 MG tablet    Encounter for other general counseling or advice on contraception                  Plan     GYN follow up for amenorrhea  Labs last visit were wnl  Ultrasound today was wnl. Endometrial thickness 7.7mm  Patient requested information on IUD options. Mirena and Kyleena both shown to patient and discussed risks including bleeding, pain, infection, and migration. Kyleena is smaller dosage of progesterone and is good for 5 years. Mirena is a small amount more or progesterone and is good for 8 years. Pamphlet given to " patient. Discussed insertion procedure and follow up. Patient will call when she is on her period and has not had unprotected sex in last four weeks if she decides she wants IUD placement.    Will do provera first to induce period.       Jaymie Joya, APRN  10/15/2024

## 2024-10-25 ENCOUNTER — TELEPHONE (OUTPATIENT)
Dept: OBSTETRICS AND GYNECOLOGY | Facility: CLINIC | Age: 24
End: 2024-10-25
Payer: MEDICAID

## 2024-10-25 NOTE — TELEPHONE ENCOUNTER
Pt states she believes the Provera she was prescribed is causing her to break out in a rash. She states when she woke up this morning it was there and she started the medication 9 days ago and she has one pill left. She states it is on her arm and her abdomin and she is not having any trouble breathing. She would like to speak to someone about this     Please advise

## 2024-10-25 NOTE — TELEPHONE ENCOUNTER
APRN pt     SW pt. She reports waking up this morning with rash on abdomen and extremities. She denies any emergent s/s. She has taken 9/10 days of provera. She denies any medication allergies or reactions in past. Pt denies any change in detergents or exposure to chemicals. RN informed pt that rash is highly unlikely top be caused by provera. If emergent s/s develop to report to ED. Recommended to take benadryl and apply antihistamine cream to affected area. Pt GEORGI.

## 2024-10-28 ENCOUNTER — TELEMEDICINE (OUTPATIENT)
Dept: PSYCHIATRY | Facility: CLINIC | Age: 24
End: 2024-10-28
Payer: MEDICAID

## 2024-10-28 DIAGNOSIS — F33.1 MODERATE EPISODE OF RECURRENT MAJOR DEPRESSIVE DISORDER: ICD-10-CM

## 2024-10-28 DIAGNOSIS — F41.1 GAD (GENERALIZED ANXIETY DISORDER): Primary | ICD-10-CM

## 2024-10-28 PROCEDURE — 90837 PSYTX W PT 60 MINUTES: CPT

## 2024-10-28 NOTE — PROGRESS NOTES
Date: 2024  Time In: 08:59 EDT  Time out: 9:54 AM EST    This provider is located at [facility], [address] [place] [address], [facility], using a secure STYLIGHThart Video Visit through [employer]. Patient is being seen remotely via telehealth at their home address is located in [place]. Patient stated they are in a secure environment for this session. The patient's condition being diagnosed and treated is appropriate for telemedicine. The provider identified themself as well as their credentials. The patient, or  patient's legal guardian consent to be seen remotely, and when consent is given they understand that the consent allows for patient identifiable information to be sent to a third party as needed. They may refuse to be seen remotely at any time. The electronic data is encrypted and password protected, and the patient's or  legal guardian has been advised of the potential risks to privacy not withstanding such measures.   PT Identifiers used: Name and .    You have chosen to receive care through a telehealth visit.  Do you consent to use a video/audio connection for your medical care today? Yes    Subjective   Jenny Brunson is a 24 y.o. female who presents today for follow up    Chief Complaint:   Chief Complaint   Patient presents with    Anxiety    Depression        Data: Pt reported she is feeling tired and depressed. Pt reported she has started a moms group -meets on  and enjoying this. 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 Pt reported some stress when her daughter fell and hit her mouth, harming her teeth. Pt reported the FOC is being absent and not taking initiative to contact the kids. Pt reported she has started paperwork for child support.    Clinical Maneuvering/Intervention: Assisted patient in processing above session content; acknowledged and normalized patient’s thoughts,  feelings, and concerns.  Rationalized patient thought process regarding concerns presented at session.  Discussed triggers associated with patient's  anxiety  and depression  Also discussed coping skills for patient to implement such as grounding , mindfulness , self care , and positive self talk     Allowed patient to freely discuss issues without interruption or judgment. Provided safe, confidential environment to facilitate the development of positive therapeutic relationship and encourage open, honest communication. Assisted patient in identifying risk factors which would indicate the need for higher level of care including thoughts to harm self or others and/or self-harming behavior and encouraged patient to contact this office, call 911, or present to the nearest emergency room should any of these events occur. Discussed crisis intervention services and means to access. Patient adamantly and convincingly denies current suicidal or homicidal ideation or perceptual disturbance.    Assessment: Pt was alert and oriented x3. Pt was located in a secure location for confidentiality/privacy. Pt appears motivated to improve MH and overall quality of life. Pt appears under increased stress with the FOC, financial issues. Pt appears to be struggling accepting the person that the FOC is and lack of effort. Pt appears resentful to the FOC. Pt appears to have an increase in depression and lack of motivation. Pt appears aware of what needs to be prioritized but struggling to start task. Pt appeared receptive to techniques and encouragements discussed in session.    Patient appears to maintain relative stability as compared to their baseline.  However, patient continues to struggle with   Chief Complaint   Patient presents with    Anxiety    Depression    which continues to cause impairment in important areas of functioning.  A result, they can be reasonably expected to continue to benefit from treatment and would likely be  at increased risk for decompensation otherwise.    Mental Status Exam:   Hygiene:   good  Cooperation:  Cooperative  Eye Contact:  Good  Psychomotor Behavior:  Appropriate  Affect:  Appropriate  Mood: normal  Speech:  Normal  Thought Process:  Linear  Thought Content:  Normal  Suicidal:  None  Homicidal:  None  Hallucinations:  None  Delusion:  None  Memory:  Intact  Orientation:  Person, Place, and Time  Reliability:  good  Insight:  Fair  Judgement:  Fair  Impulse Control:  Fair  Physical/Medical Issues:  No        Patient's Support Network Includes:   limited support    Functional Status: No impairment    Progress toward goal: Not at goal    Prognosis: Fair with Ongoing Treatment     Plan: wait list    Patient will continue in individual outpatient therapy with focus on improved functioning and coping skills, maintaining stability, and avoiding decompensation and the need for higher level of care.    Patient will adhere to medication regimen as prescribed and report any side effects. Patient will contact this office, call 911 or present to the nearest emergency room should suicidal or homicidal ideations occur. Provide Cognitive Behavioral Therapy and Solution Focused Therapy to improve functioning, maintain stability, and avoid decompensation and the need for higher level of care.     Return in about 9 weeks (around 12/30/2024).      VISIT DIAGNOSIS:    Diagnosis Plan   1. MECCA (generalized anxiety disorder)        2. Moderate episode of recurrent major depressive disorder         08:59 EDT         This document has been electronically signed by Shanta Siegel LCSW  October 28, 2024      Part of this note may be an electronic transcription/translation of spoken language to printed text using the Dragon Dictation System.

## 2024-11-05 ENCOUNTER — TELEPHONE (OUTPATIENT)
Dept: OBSTETRICS AND GYNECOLOGY | Facility: CLINIC | Age: 24
End: 2024-11-05

## 2024-11-05 NOTE — TELEPHONE ENCOUNTER
Hub staff attempted to follow warm transfer process and was unsuccessful     Caller: Jenny Brunson    Relationship to patient: Self    Best call back number: [phone]    Patient is needing: PT STARTED HER CYCLE TODAY AND CALL IN TO SCHEDULE AN IUD PLACEMENT APPT

## 2024-11-06 ENCOUNTER — OFFICE VISIT (OUTPATIENT)
Dept: OBSTETRICS AND GYNECOLOGY | Facility: CLINIC | Age: 24
End: 2024-11-06
Payer: MEDICAID

## 2024-11-06 VITALS
SYSTOLIC BLOOD PRESSURE: 122 MMHG | BODY MASS INDEX: 37.18 KG/M2 | WEIGHT: 251 LBS | HEIGHT: 69 IN | DIASTOLIC BLOOD PRESSURE: 76 MMHG

## 2024-11-06 DIAGNOSIS — Z01.419 WELL WOMAN EXAM WITH ROUTINE GYNECOLOGICAL EXAM: Primary | ICD-10-CM

## 2024-11-06 DIAGNOSIS — Z11.3 SCREENING FOR STDS (SEXUALLY TRANSMITTED DISEASES): ICD-10-CM

## 2024-11-06 DIAGNOSIS — Z32.02 PREGNANCY EXAMINATION OR TEST, NEGATIVE RESULT: ICD-10-CM

## 2024-11-06 DIAGNOSIS — Z30.430 ENCOUNTER FOR IUD INSERTION: ICD-10-CM

## 2024-11-06 LAB
B-HCG UR QL: NEGATIVE
EXPIRATION DATE: NORMAL
INTERNAL NEGATIVE CONTROL: NEGATIVE
INTERNAL POSITIVE CONTROL: POSITIVE
Lab: NORMAL

## 2024-11-06 NOTE — PROGRESS NOTES
"     Gynecologic Annual Exam Note        Contraception (IUD insertion) and Gynecologic Exam        Subjective     HPI  Jenny Brunson is a 24 y.o.  female who presents for annual well woman exam as an established patient. There were no changes to her medical or surgical history since her last visit. Patient's last menstrual period was 2024 (exact date).  Her periods are irregular, last period was in 2024. She was seen on 10/15/24 and was given Provera to induce period.  The flow is heavy. She reports dysmenorrhea is moderate occurring throughout menses. Marital Status: single.  She is sexually active. She has had new partners. STD testing recommendations have been explained to the patient and she does desire STD testing.    The patient would like to discuss the following complaints today: None.    Additional OB/GYN History   contraceptive methods: None  Desires to: start Kyleena for contraception  Thromboembolic Disease: none  History of migraines: no      History of STD: no    Last Pap : Never.  Last Completed Pap Smear       This patient has no relevant Health Maintenance data.             History of abnormal Pap smear: no  Gardasil status:unsure if she received the vaccine  Family history of uterine, colon, breast, or ovarian cancer: yes - [facility]- breast cancer.  Performs monthly Self-Breast Exam: no  Exercises Regularly:no  Feelings of Anxiety or Depression: no  Tobacco Usage?: Yes, vaping.     No current outpatient medications on file.    Current Facility-Administered Medications:     levonorgestrel (KYLEENA) 19.5 MG IUD 1 each, 1 each, Intrauterine, Once, Becki Ty APRN     Patient denies the need for medication refills today.    Procedure: IUD Insertion     Procedures    Pre procedure indication 1) Desires Kyleena  Post procedure indication 1) Desires Kyleena    NDC: Kyleena  [id number]  Lot #: [id number]  Exp Date: 2026/DEC   device    Ht 175.3 cm (69\")   Wt 114 kg (251 lb)   LMP " 2024 (Exact Date)   BMI 37.07 kg/m²     800mg Ibuprofen given prior to procedure.    The risks, benefits, and alternatives to Kyleena were explained at length with the patient. All her questions were answered and consents were signed.  Her LMP was 24 .  Urine Pregnancy Test was Negative.  Patient does not have an allergy to betadine or shellfish. She reports having intercourse on 10/31/24 and used a condom.     Time out: immediate members of the procedure team and patient agree to the following: correct patient, correct site, correct procedure to be performed. FRANCISCO JAVIER Hickey      The patient was placed in a dorsal lithotomy position on the examining table in [facility]. A speculum was inserted into the vagina and the cervix was brought into view.  The cervix was prepped with Betadine. The endometrial cavity was then sounded to 10 centimeters. The sealed Kyleena package was opened and the IUD was removed in a sterile fashion.    The upper edge of the depth setting the flange was set at the uterine sound measurement. The  was then carefully advanced to the cervical canal into the uterus to the level of the fundus.  The slider was then retracted about 1 cm and deployed the device. The device was then gently advanced to the fundus. The IUD was then released by pulling the slider down all the way. The  was removed carefully from the uterus. The threads were then cut leaving 2-3 cm visible outside of the cervix.  All other instruments were removed from the vagina.       The patient tolerated the procedure very well with a moderate amount of discomfort.  She was monitored for 5 minutes prior to discharge.      OB History          2    Para   2    Term   2       0    AB   0    Living   2         SAB   0    IAB   0    Ectopic   0    Molar   0    Multiple   0    Live Births   2          Obstetric Comments    - [facility]   Topic Date Due    Annual  "Gynecologic Pelvic and Breast Exam  Never done    BMI FOLLOWUP  Never done    Pneumococcal Vaccine 0-64 (1 of 2 - PCV) 07/02/2008    HPV VACCINES (2 - 2-dose series) 05/14/2014    ANNUAL PHYSICAL  Never done    PAP SMEAR  Never done    CHLAMYDIA SCREENING  04/17/2024    INFLUENZA VACCINE  08/01/2024    COVID-19 Vaccine (3 - 2024-25 season) 09/01/2024    TDAP/TD VACCINES (3 - Td or Tdap) 11/01/2033    HEPATITIS C SCREENING  Completed    MENINGOCOCCAL VACCINE  Aged Out       Past Medical History:   Diagnosis Date    Childhood asthma 2007    Depression with anxiety 2016        Past Surgical History:   Procedure Laterality Date    TONSILLECTOMY  2017    due to abscess    WISDOM TOOTH EXTRACTION  2020       The additional following portions of the patient's history were reviewed and updated as appropriate: allergies, current medications, past family history, past medical history, past social history, past surgical history, and problem list.    Review of Systems   Constitutional: Negative.    HENT: Negative.     Eyes: Negative.    Respiratory: Negative.     Cardiovascular: Negative.    Gastrointestinal: Negative.    Endocrine: Negative.    Genitourinary:  Positive for menstrual problem.   Musculoskeletal: Negative.    Skin: Negative.    Allergic/Immunologic: Negative.    Neurological: Negative.    Hematological: Negative.    Psychiatric/Behavioral: Negative.           I have reviewed and agree with the HPI, ROS, and historical information as entered above. FRANCISCO JAVIER Hickey          Objective   /76   Ht 175.3 cm (69\")   Wt 114 kg (251 lb)   LMP 11/05/2024 (Exact Date)   BMI 37.07 kg/m²     Physical Exam  Vitals and nursing note reviewed. Exam conducted with a chaperone present.   Constitutional:       General: She is not in acute distress.     Appearance: Normal appearance. She is well-developed. She is not ill-appearing.   Neck:      Thyroid: No thyroid mass or thyromegaly.   Pulmonary:      Effort: Pulmonary " effort is normal. No respiratory distress or retractions.   Chest:      Chest wall: No mass.   Breasts:     Right: Normal. No mass, nipple discharge, skin change or tenderness.      Left: Normal. No mass, nipple discharge, skin change or tenderness.      Comments: Fibrocystic tissue present bilaterally    Abdominal:      General: There is no distension.      Palpations: Abdomen is soft. Abdomen is not rigid. There is no mass.      Tenderness: There is no abdominal tenderness. There is no guarding or rebound.      Hernia: No hernia is present.   Genitourinary:     General: Normal vulva.      Exam position: Lithotomy position.      Labia:         Right: No rash, tenderness or lesion.         Left: No rash, tenderness or lesion.       Vagina: Bleeding present. No vaginal discharge or lesions.      Cervix: Cervical bleeding present. No cervical motion tenderness, discharge or friability.      Uterus: Normal. Not enlarged, not fixed and not tender.       Adnexa: Right adnexa normal and left adnexa normal.        Right: No mass or tenderness.          Left: No mass or tenderness.        Rectum: Normal. No external hemorrhoid.      Comments: See IUD insertion procedure note  Musculoskeletal:      Cervical back: No muscular tenderness.   Skin:     General: Skin is warm and dry.   Neurological:      Mental Status: She is alert and oriented to person, place, and time.   Psychiatric:         Mood and Affect: Mood normal.         Behavior: Behavior normal.            Assessment and Plan    Problem List Items Addressed This Visit    None  Visit Diagnoses       Encounter for IUD insertion    -  Primary    Relevant Medications    levonorgestrel (KYLEENA) 19.5 MG IUD 1 each (Start on 11/6/2024  4:00 PM)    Other Relevant Orders    POC Pregnancy, Urine (Completed)    US Non-ob Transvaginal    Well woman exam with routine gynecological exam        Relevant Orders    LIQUID-BASED PAP SMEAR WITH HPV GENOTYPING REGARDLESS OF  INTERPRETATION ([facility])    Screening for STDs (sexually transmitted diseases)        Relevant Orders    LIQUID-BASED PAP SMEAR WITH HPV GENOTYPING REGARDLESS OF INTERPRETATION ([facility])            GYN annual well woman exam.   Reviewed pap guidelines. Obtained today with STI testing.   IUD inserted. F/u in 4 wks for ultrasound.  Encouraged use of condoms for STD prevention.  Fibrocystic breast changes - Encouraged decreasing caffeine, supportive bra, low dose vitamin E supplementation.  Reviewed monthly self breast exams.  Instructed to call with lumps, pain, or breast discharge.    RTC in 1 year or PRN with problems  Return in about 4 weeks (around 12/4/2024) for IUD check, ultrasound.    Becki Ty, FRANCISCO JAVIER  11/06/2024

## 2024-11-08 LAB — REF LAB TEST METHOD: NORMAL

## 2024-12-05 ENCOUNTER — OFFICE VISIT (OUTPATIENT)
Dept: OBSTETRICS AND GYNECOLOGY | Facility: CLINIC | Age: 24
End: 2024-12-05
Payer: MEDICAID

## 2024-12-05 VITALS
SYSTOLIC BLOOD PRESSURE: 126 MMHG | DIASTOLIC BLOOD PRESSURE: 78 MMHG | BODY MASS INDEX: 36.46 KG/M2 | WEIGHT: 246.2 LBS | HEIGHT: 69 IN

## 2024-12-05 DIAGNOSIS — Z30.431 SURVEILLANCE OF PREVIOUSLY PRESCRIBED INTRAUTERINE CONTRACEPTIVE DEVICE: Primary | ICD-10-CM

## 2024-12-05 NOTE — PROGRESS NOTES
"    Chief Complaint   Patient presents with    Contraception     IUD check         Subjective   HPI  Jenny Brunson is a 24 y.o. female, , who presents for IUD check follow up.  She had a Kyleena placed on 24. Since the IUD placement, the patient reports increased pain and acne and breast tenderness . States cramping has increased and occasionally is severe. She is taking tylenol to help with this. She has had a period since the IUD was placed, is having daily spotting.     The additional following portions of the patient's history were reviewed and updated as appropriate: allergies, current medications, past family history, past medical history, past social history, past surgical history, and problem list.    Did the patient have u/s today? Yes.  Findings showed IUD had correct placement.  Pending physician review.    Review of Systems   Constitutional: Negative.    HENT: Negative.     Eyes: Negative.    Respiratory: Negative.     Cardiovascular: Negative.    Gastrointestinal: Negative.    Endocrine: Negative.    Genitourinary:         Dysmenorrhea    Musculoskeletal: Negative.    Skin:         Acne   Allergic/Immunologic: Negative.    Neurological: Negative.    Hematological: Negative.    Psychiatric/Behavioral: Negative.       All other systems reviewed and are negative.     I have reviewed and agree with the HPI, ROS, and historical information as entered above. Becki Ty, FRANCISCO JAVIER      Objective   /78   Ht 175.3 cm (69\")   Wt 112 kg (246 lb 3.2 oz)   LMP  (LMP Unknown)   BMI 36.36 kg/m²     Physical Exam  Vitals and nursing note reviewed.   Constitutional:       Appearance: Normal appearance.   Pulmonary:      Effort: Pulmonary effort is normal.   Musculoskeletal:         General: Normal range of motion.   Neurological:      Mental Status: She is alert and oriented to person, place, and time.         Assessment & Plan     Assessment     Problem List Items Addressed This Visit  "   None  Visit Diagnoses       Surveillance of previously prescribed intrauterine contraceptive device    -  Primary              Plan     IUD placed correctly.   Recommend taking ibuprofen 800mg every 8 hours as needed for pain.   Return in about 11 months (around 11/10/2025) for Annual physical.      FRANCISCO JAVIER Hickey  12/05/2024

## 2024-12-17 ENCOUNTER — TELEMEDICINE (OUTPATIENT)
Dept: PSYCHIATRY | Facility: CLINIC | Age: 24
End: 2024-12-17
Payer: MEDICAID

## 2024-12-17 DIAGNOSIS — F41.1 GAD (GENERALIZED ANXIETY DISORDER): Primary | ICD-10-CM

## 2024-12-17 DIAGNOSIS — F33.1 MODERATE EPISODE OF RECURRENT MAJOR DEPRESSIVE DISORDER: ICD-10-CM
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
## 2025-01-06 ENCOUNTER — TELEMEDICINE (OUTPATIENT)
Dept: PSYCHIATRY | Facility: CLINIC | Age: 25
End: 2025-01-06
Payer: MEDICAID

## 2025-01-06 DIAGNOSIS — F41.1 GAD (GENERALIZED ANXIETY DISORDER): Primary | ICD-10-CM

## 2025-01-06 DIAGNOSIS — F33.1 MODERATE EPISODE OF RECURRENT MAJOR DEPRESSIVE DISORDER: ICD-10-CM
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
## 2025-01-20 ENCOUNTER — TELEMEDICINE (OUTPATIENT)
Dept: PSYCHIATRY | Facility: CLINIC | Age: 25
End: 2025-01-20
Payer: MEDICAID

## 2025-01-20 DIAGNOSIS — F41.1 GAD (GENERALIZED ANXIETY DISORDER): Primary | ICD-10-CM

## 2025-01-20 DIAGNOSIS — F43.10 POST TRAUMATIC STRESS DISORDER (PTSD): ICD-10-CM

## 2025-01-20 DIAGNOSIS — F33.1 MODERATE EPISODE OF RECURRENT MAJOR DEPRESSIVE DISORDER: ICD-10-CM

## 2025-01-20 PROCEDURE — 90834 PSYTX W PT 45 MINUTES: CPT
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
## 2025-01-20 NOTE — TREATMENT PLAN
Multi-Disciplinary Problems (from Behavioral Health Treatment Plan)      Active Problems       Problem: Anxiety  Start Date: 01/20/25      Problem Details: The patient self-scales this problem as a 8 with 10 being the worst.          Goal Priority Start Date Expected End Date End Date    Patient will develop and implement behavioral and cognitive strategies to reduce anxiety and irrational fears. Low 01/20/25 07/21/25 --    Goal Details: Progress toward goal:  Not appropriate to rate progress toward goal since this is the initial treatment plan.          Goal Intervention Frequency Start Date End Date    Help patient explore past emotional issues in relation to present anxiety. PRN 01/20/25 --    Intervention Details: Duration of treatment until discharged.          Goal Intervention Frequency Start Date End Date    Help patient develop an awareness of their cognitive and physical responses to anxiety. PRN 01/20/25 --    Intervention Details: Duration of treatment until discharged.                  Problem: Depression  Start Date: 01/20/25      Problem Details: The patient self-scales this problem as a 7 with 10 being the worst.          Goal Priority Start Date Expected End Date End Date    Patient will demonstrate the ability to initiate new constructive life skills outside of sessions on a consistent basis. Low 01/20/25 07/21/25 --    Goal Details: Progress toward goal:  Not appropriate to rate progress toward goal since this is the initial treatment plan.          Goal Intervention Frequency Start Date End Date    Assist patient in setting attainable activities of daily living goals. PRN 01/20/25 --    Intervention Details: Clinician will aid pt in increasing structure, routine, focusing accomplishments instead of perceived failures.     Clinician will aid pt with implementing coping skills or techniques that aid with increasing natural dopamine, serotonin and endorphins.          Goal Intervention Frequency  Start Date End Date    Provide education about depression PRN 01/20/25 --    Intervention Details: Duration of treatment until discharged.          Goal Intervention Frequency Start Date End Date    Assist patient in developing healthy coping strategies. PRN 01/20/25 --    Intervention Details: Duration of treatment until discharged.                          Reviewed By       Shanta Siegel LCSW 01/20/25 [id number]                     I have discussed and reviewed this treatment plan with the patient.

## 2025-01-20 NOTE — PLAN OF CARE
Discussed tx plan with patient. Tx plan was not printed due to meeting virtually. Pt was able to identify triggers relatively well. Pt has coping skills that could be implemented more in moments of increased emotions. Pt reported an understanding of how MH symptoms can affect behaviors/thought process. Pt may benefit from more frequent sessions, although due to schedule/availability this is unattainable at this time. Current life stressors include; finances, work schedule, single parent

## 2025-01-23 ENCOUNTER — OFFICE VISIT (OUTPATIENT)
Dept: OBSTETRICS AND GYNECOLOGY | Facility: CLINIC | Age: 25
End: 2025-01-23
Payer: MEDICAID

## 2025-01-23 VITALS
SYSTOLIC BLOOD PRESSURE: 116 MMHG | DIASTOLIC BLOOD PRESSURE: 72 MMHG | WEIGHT: 248.4 LBS | HEIGHT: 69 IN | BODY MASS INDEX: 36.79 KG/M2

## 2025-01-23 DIAGNOSIS — N89.8 VAGINAL ODOR: ICD-10-CM

## 2025-01-23 DIAGNOSIS — R35.0 URINARY FREQUENCY: ICD-10-CM

## 2025-01-23 DIAGNOSIS — N89.8 VAGINAL ITCHING: Primary | ICD-10-CM

## 2025-01-23 DIAGNOSIS — Z11.3 SCREENING EXAMINATION FOR STD (SEXUALLY TRANSMITTED DISEASE): ICD-10-CM

## 2025-01-23 LAB
B-HCG UR QL: NEGATIVE
BILIRUB BLD-MCNC: NEGATIVE MG/DL
CLARITY, POC: CLEAR
COLOR UR: YELLOW
EXPIRATION DATE: NORMAL
GLUCOSE UR STRIP-MCNC: NEGATIVE MG/DL
INTERNAL NEGATIVE CONTROL: NEGATIVE
INTERNAL POSITIVE CONTROL: POSITIVE
KETONES UR QL: NEGATIVE
LEUKOCYTE EST, POC: NEGATIVE
Lab: NORMAL
NITRITE UR-MCNC: NEGATIVE MG/ML
PH UR: 6 [PH] (ref 5–8)
PROT UR STRIP-MCNC: NEGATIVE MG/DL
RBC # UR STRIP: NEGATIVE /UL
SP GR UR: 1.02 (ref 1–1.03)
UROBILINOGEN UR QL: NORMAL

## 2025-01-23 RX ORDER — FLUCONAZOLE 150 MG/1
150 TABLET ORAL DAILY
Qty: 2 TABLET | Refills: 0 | Status: SHIPPED | OUTPATIENT
Start: 2025-01-23

## 2025-01-23 NOTE — PROGRESS NOTES
Chief Complaint   Patient presents with    Vaginitis         Subjective   HPI  Jenny Brunson is a 24 y.o. female, , who presents for evaluation of odor and vulvar itching. The discharge is bloody and brown and thick.  Her symptoms have been present for 7 day(s).  Additional she has noticed  spotting for the past week and urinary frequency .    Prior to the onset of symptoms she was on antibiotics for an ear infection about 2 weeks ago.  She has not recently changed soaps/detergents/toilet tissue.  Prior to this visit, she has used water and antibacterial soap on vulva in an attempt to improve her symptoms.     Sexual History    She is currently sexually active. In the past year there has been ONE new sexual partner. Condoms are never used.  She would like to be screened for STD's at today's exam.    Current birth control method: IUD - Kyleena.    Menstrual History:    Patient's last menstrual period was 01/15/2025 (exact date).    In the past 6 months her cycles have been irregular infrequent spotting with Kyleena . Post-coital bleeding is absent.      Additional OB/GYN History   Last Pap : 24 Negative, HPV negative.  Last Completed Pap Smear            PAP SMEAR (Every 3 Years) Next due on 2024  LIQUID-BASED PAP SMEAR WITH HPV GENOTYPING REGARDLESS OF INTERPRETATION ([facility])                    OB History          2    Para   2    Term   2       0    AB   0    Living   2         SAB   0    IAB   0    Ectopic   0    Molar   0    Multiple   0    Live Births   2          Obstetric Comments    - Nova               The additional following portions of the patient's history were reviewed and updated as appropriate: allergies, current medications, past family history, past medical history, past social history, past surgical history, and problem list.    Review of Systems   Constitutional: Negative.    HENT: Negative.     Eyes: Negative.   "  Respiratory: Negative.     Cardiovascular: Negative.    Gastrointestinal: Negative.    Endocrine: Negative.    Genitourinary:  Positive for vaginal discharge.        Vaginal odor, vaginal itching   Musculoskeletal: Negative.    Skin: Negative.    Allergic/Immunologic: Negative.    Neurological: Negative.    Hematological: Negative.    Psychiatric/Behavioral: Negative.       All other systems reviewed and are negative.     I have reviewed and agree with the HPI, ROS, and historical information as entered above. Carlyle Newman, APRN      Objective   /72   Ht 175.3 cm (69\")   Wt 113 kg (248 lb 6.4 oz)   LMP 01/15/2025 (Exact Date)   BMI 36.68 kg/m²     Physical Exam  Vitals and nursing note reviewed. Exam conducted with a chaperone present.   Constitutional:       Appearance: Normal appearance.   Genitourinary:     General: Normal vulva.      Exam position: Lithotomy position.      Labia:         Right: No rash, tenderness or lesion.         Left: No rash, tenderness or lesion.       Vagina: Normal. No lesions.      Cervix: Normal. No cervical motion tenderness, discharge, lesion or cervical bleeding.      Uterus: Normal. Not enlarged, not fixed and not tender.       Adnexa: Right adnexa normal and left adnexa normal.        Right: No mass or tenderness.          Left: No mass or tenderness.        Rectum: Normal. No external hemorrhoid.      Comments: Chaperone Present. IUD strings visible  Neurological:      Mental Status: She is alert.         Assessment & Plan     Assessment and Plan    Problem List Items Addressed This Visit    None  Visit Diagnoses       Vaginal itching    -  Primary    Relevant Orders    NuSwab VG, Candida 6sp - Swab, Vagina    Vaginal odor        Relevant Orders    NuSwab VG, Candida 6sp - Swab, Vagina    Screening examination for STD (sexually transmitted disease)        Relevant Orders    NuSwab VG, Candida 6sp - Swab, Vagina              NuSwab ordered  Medication(s) " ordered  Counseling on Vaginitis provided  CCUA and UPT negative today  Diflucan Rx'd          Carlyle Newman, APRN  01/23/2025

## 2025-01-27 DIAGNOSIS — A59.01 TRICHOMONAL VAGINITIS: Primary | ICD-10-CM

## 2025-01-27 LAB
A VAGINAE DNA VAG QL NAA+PROBE: ABNORMAL SCORE
BVAB2 DNA VAG QL NAA+PROBE: ABNORMAL SCORE
C ALBICANS DNA VAG QL NAA+PROBE: NEGATIVE
C GLABRATA DNA VAG QL NAA+PROBE: NEGATIVE
C KRUSEI DNA VAG QL NAA+PROBE: NEGATIVE
C LUSITANIAE DNA VAG QL NAA+PROBE: NEGATIVE
CANDIDA DNA VAG QL NAA+PROBE: NEGATIVE
MEGA1 DNA VAG QL NAA+PROBE: ABNORMAL SCORE
T VAGINALIS DNA VAG QL NAA+PROBE: POSITIVE

## 2025-01-27 RX ORDER — METRONIDAZOLE 500 MG/1
500 TABLET ORAL 2 TIMES DAILY
Qty: 14 TABLET | Refills: 0 | Status: SHIPPED | OUTPATIENT
Start: 2025-01-27 | End: 2025-02-03

## 2025-02-03 ENCOUNTER — TELEMEDICINE (OUTPATIENT)
Dept: PSYCHIATRY | Facility: CLINIC | Age: 25
End: 2025-02-03
Payer: MEDICAID

## 2025-02-03 DIAGNOSIS — F33.1 MODERATE EPISODE OF RECURRENT MAJOR DEPRESSIVE DISORDER: ICD-10-CM

## 2025-02-03 DIAGNOSIS — F41.1 GAD (GENERALIZED ANXIETY DISORDER): Primary | ICD-10-CM

## 2025-02-03 DIAGNOSIS — F43.10 POST TRAUMATIC STRESS DISORDER (PTSD): ICD-10-CM

## 2025-02-03 NOTE — PROGRESS NOTES
Date: February 3, 2025  Time In: 15:57 EST  Time out: 4:53 PM EST    This provider is located at [facility], [facility] [place], [place], [facility], using a secure [email or web address] Video Visit through [employer]. Patient is being seen remotely via telehealth at their home address is located in [place]. Patient stated they are in a secure environment for this session. The patient's condition being diagnosed and treated is appropriate for telemedicine. The provider identified themself as well as their credentials. The patient, or  patient's legal guardian consent to be seen remotely, and when consent is given they understand that the consent allows for patient identifiable information to be sent to a third party as needed. They may refuse to be seen remotely at any time. The electronic data is encrypted and password protected, and the patient's or  legal guardian has been advised of the potential risks to privacy not withstanding such measures.   PT Identifiers used: Name and .    You have chosen to receive care through a telehealth visit.  Do you consent to use a video/audio connection for your medical care today? Yes    Subjective   Jenny Brunson is a 24 y.o. female who presents today for follow up    Chief Complaint:   Chief Complaint   Patient presents with    Anxiety    Depression    PTSD        Data: Pt reported that she quit her job. Pt reported hat she made two complaints to the manager re coworkers. Pt reported her values and work values don't align. Pt reported hat she is going to also report this to state. Pt reported that she has been struggling with depression symptoms increased over the past week. Pt reported that she has been very accomplished today and feeling somewhat better. Pt reported making a plan with her tax money that have long term benefits. Pt reported that she has not looked more into EMDR therapy at this time.       Clinical Maneuvering/Intervention: Assisted patient  in processing above session content; acknowledged and normalized patient’s thoughts, feelings, and concerns.  Rationalized patient thought process regarding concerns presented at session.  Discussed triggers associated with patient's  anxiety , depression , and PTSD Also discussed coping skills for patient to implement such as grounding , mindfulness , increasing activity , self care , and positive self talk     Allowed patient to freely discuss issues without interruption or judgment. Provided safe, confidential environment to facilitate the development of positive therapeutic relationship and encourage open, honest communication. Assisted patient in identifying risk factors which would indicate the need for higher level of care including thoughts to harm self or others and/or self-harming behavior and encouraged patient to contact this office, call 911, or present to the nearest emergency room should any of these events occur. Discussed crisis intervention services and means to access. Patient adamantly and convincingly denies current suicidal or homicidal ideation or perceptual disturbance.    Assessment:  Pt was alert and oriented x3.  Pt appears open and honest re MH symptoms that have affected life in a negative way. Pt appears to have increased anxiety de to quitting her job and reporting issues. Pts depression appears to be improving since last week. Pt appears to feel productive with cleaning. Pt appears to be proactive in changing her behavior today to aid with her MH. Pt appears to have short term and long term plans. Pt has not sought out EMDR therapy at this time, considerations.     Patient appears to maintain relative stability as compared to their baseline.  However, patient continues to struggle with   Chief Complaint   Patient presents with    Anxiety    Depression    PTSD    which continues to cause impairment in important areas of functioning.  A result, they can be reasonably expected to  continue to benefit from treatment and would likely be at increased risk for decompensation otherwise.      Mental Status Exam:   Hygiene:   good  Cooperation:  good  Eye Contact:  Good  Psychomotor Behavior:  Appropriate  Affect:  Appropriate  Mood: normal  Speech:  Normal  Thought Process:  Linear  Thought Content:  Normal  Suicidal:  None  Homicidal:  None  Hallucinations:  None  Delusion:  None  Memory:  Intact  Orientation:  Person, Place, and Time  Reliability:  fair  Insight:  Fair  Judgement:  Fair  Impulse Control:  Fair  Physical/Medical Issues:  No        Patient's Support Network Includes:   limited healthy support    Functional Status: No impairment    Progress toward goal: Not at goal    Prognosis: Fair with Ongoing Treatment     Plan:     Patient will continue in individual outpatient therapy with focus on improved functioning and coping skills, maintaining stability, and avoiding decompensation and the need for higher level of care.    Patient will adhere to medication regimen as prescribed and report any side effects. Patient will contact this office, call 911 or present to the nearest emergency room should suicidal or homicidal ideations occur. Provide Cognitive Behavioral Therapy and Solution Focused Therapy to improve functioning, maintain stability, and avoid decompensation and the need for higher level of care.     Return in about 1 week (around 2/10/2025).      VISIT DIAGNOSIS:    Diagnosis Plan   1. MECCA (generalized anxiety disorder)        2. Moderate episode of recurrent major depressive disorder        3. Post traumatic stress disorder (PTSD)         15:57 EST         This document has been electronically signed by Shanta Siegel LCSW  February 3, 2025      Part of this note may be an electronic transcription/translation of spoken language to printed text using the Dragon Dictation System.

## 2025-02-12 ENCOUNTER — TELEMEDICINE (OUTPATIENT)
Dept: PSYCHIATRY | Facility: CLINIC | Age: 25
End: 2025-02-12
Payer: MEDICAID

## 2025-02-12 DIAGNOSIS — F33.1 MODERATE EPISODE OF RECURRENT MAJOR DEPRESSIVE DISORDER: ICD-10-CM

## 2025-02-12 DIAGNOSIS — F41.1 GAD (GENERALIZED ANXIETY DISORDER): Primary | ICD-10-CM

## 2025-02-12 DIAGNOSIS — F43.10 POST TRAUMATIC STRESS DISORDER (PTSD): ICD-10-CM

## 2025-02-12 NOTE — PROGRESS NOTES
Date: 2025  Time In: 15:00 EST  Time out: 2:    This provider is located at [facility], [facility], [place], [place], [facility], using a secure [email or web address] Video Visit through [employer]. Patient is being seen remotely via telehealth at their home address is located in [place]. Patient stated they are in a secure environment for this session. The patient's condition being diagnosed and treated is appropriate for telemedicine. The provider identified themself as well as their credentials. The patient, or  patient's legal guardian consent to be seen remotely, and when consent is given they understand that the consent allows for patient identifiable information to be sent to a third party as needed. They may refuse to be seen remotely at any time. The electronic data is encrypted and password protected, and the patient's or  legal guardian has been advised of the potential risks to privacy not withstanding such measures.   PT Identifiers used: Name and .    You have chosen to receive care through a telehealth visit.  Do you consent to use a video/audio connection for your medical care today? Yes    Subjective   Jenny Brunson is a 24 y.o. female who presents today for follow up    Chief Complaint:   Chief Complaint   Patient presents with    Anxiety    Depression    PTSD        Data: Pt reported that her kids have been sick. Pt reported that she has some stress with finances. Pt reported that her rent was raised when she was working, quit a few weeks ago. Pt reported that she will begin looking for new employment soon. Pt reported that the mom group she attends will aid with finances. Pt reported that she has not got back to the gym since last session. Pt reflected on some issues with her moms partner.      Clinical Maneuvering/Intervention: Assisted patient in processing above session content; acknowledged and normalized patient’s thoughts, feelings, and concerns.  Rationalized  patient thought process regarding concerns presented at session.  Discussed triggers associated with patient's  anxiety , depression , and PTSD Also discussed coping skills for patient to implement such as grounding , mindfulness , increasing activity , self care , positive self talk , and boundaries    Allowed patient to freely discuss issues without interruption or judgment. Provided safe, confidential environment to facilitate the development of positive therapeutic relationship and encourage open, honest communication. Assisted patient in identifying risk factors which would indicate the need for higher level of care including thoughts to harm self or others and/or self-harming behavior and encouraged patient to contact this office, call 911, or present to the nearest emergency room should any of these events occur. Discussed crisis intervention services and means to access. Patient adamantly and convincingly denies current suicidal or homicidal ideation or perceptual disturbance.    Assessment: Pt was alert and oriented x3.  Pt appears open and honest re MH symptoms that have affected life in a negative way. Pt appears motivated to improve MH symptoms and overall quality of life. Pt appears to be under increased stress with finances, action plan developed. Pt has plans to continuing to tattoo friends and hopes to have this to aid with finances. Pt appears to have increased support with her mom group she attends on Thursdays. Pt was encouraged to set boundaries with her moms partner.     Patient appears to maintain relative stability as compared to their baseline.  However, patient continues to struggle with   Chief Complaint   Patient presents with    Anxiety    Depression    PTSD    which continues to cause impairment in important areas of functioning.  A result, they can be reasonably expected to continue to benefit from treatment and would likely be at increased risk for decompensation  otherwise.        Mental Status Exam:   Hygiene:   good  Cooperation:  Cooperative  Eye Contact:  Good  Psychomotor Behavior:  Appropriate  Affect:  Appropriate  Mood: normal  Speech:  Normal  Thought Process:  Linear  Thought Content:  Normal  Suicidal:  None  Homicidal:  None  Hallucinations:  None  Delusion:  None  Memory:  Intact  Orientation:  Person, Place, and Time  Reliability:  fair  Insight:  Fair  Judgement:  Fair  Impulse Control:  Fair  Physical/Medical Issues:  No        Patient's Support Network Includes:   limited     Functional Status: No impairment    Progress toward goal: Not at goal    Prognosis: Good with Ongoing Treatment     Plan:     Patient will continue in individual outpatient therapy with focus on improved functioning and coping skills, maintaining stability, and avoiding decompensation and the need for higher level of care.    Patient will adhere to medication regimen as prescribed and report any side effects. Patient will contact this office, call 911 or present to the nearest emergency room should suicidal or homicidal ideations occur. Provide Cognitive Behavioral Therapy and Solution Focused Therapy to improve functioning, maintain stability, and avoid decompensation and the need for higher level of care.     Return in about 1 week (around 2/19/2025).      VISIT DIAGNOSIS:    Diagnosis Plan   1. MECCA (generalized anxiety disorder)        2. Moderate episode of recurrent major depressive disorder        3. Post traumatic stress disorder (PTSD)         15:00 EST         This document has been electronically signed by Shanta Siegel LCSW  February 12, 2025      Part of this note may be an electronic transcription/translation of spoken language to printed text using the Dragon Dictation System.

## 2025-02-26 ENCOUNTER — TELEMEDICINE (OUTPATIENT)
Dept: PSYCHIATRY | Facility: CLINIC | Age: 25
End: 2025-02-26
Payer: MEDICAID

## 2025-02-26 NOTE — PROGRESS NOTES
The patient left the office after care was provided and did not complete the visit  today . Pt was in the process of moving out of her apartment due to unforseen circumstances. Next visit 3/5/2025.

## 2025-03-03 ENCOUNTER — OFFICE VISIT (OUTPATIENT)
Dept: OBSTETRICS AND GYNECOLOGY | Facility: CLINIC | Age: 25
End: 2025-03-03
Payer: MEDICAID

## 2025-03-03 VITALS — DIASTOLIC BLOOD PRESSURE: 76 MMHG | WEIGHT: 248 LBS | SYSTOLIC BLOOD PRESSURE: 110 MMHG | BODY MASS INDEX: 36.62 KG/M2

## 2025-03-03 DIAGNOSIS — A59.01 TRICHOMONAL VAGINITIS: Primary | ICD-10-CM

## 2025-03-03 DIAGNOSIS — R10.2 PELVIC PAIN: ICD-10-CM

## 2025-03-03 PROCEDURE — 99213 OFFICE O/P EST LOW 20 MIN: CPT | Performed by: NURSE PRACTITIONER

## 2025-03-03 NOTE — PROGRESS NOTES
Chief Complaint   Patient presents with    Follow-up     Test of cure       Subjective   HPI  Jenny Brunson is a 24 y.o. female, . Her last LMP was Patient's last menstrual period was 2025 (approximate).. who presents for follow up on test of cure for trichomonas.      At her last visit she was treated with Flagyl for trichomonas. Since then she reports her symptoms have improved. She denies discharge, odor, itching or burning.    Patient reports RLQ pain. She reports it has been more severe that usual. She does have hx of ovarian cysts.      Additional OB/GYN History     Last Pap :   Last Completed Pap Smear            PAP SMEAR (Every 3 Years) Next due on 2024  LIQUID-BASED PAP SMEAR WITH HPV GENOTYPING REGARDLESS OF INTERPRETATION ([facility]OHN,[facility])                    Last mammogram:   Last Completed Mammogram       This patient has no relevant Health Maintenance data.              OB History          2    Para   2    Term   2       0    AB   0    Living   2         SAB   0    IAB   0    Ectopic   0    Molar   0    Multiple   0    Live Births   2          Obstetric Comments    - Nova                 Current Outpatient Medications:     fluconazole (Diflucan) 150 MG tablet, Take 1 tablet by mouth Daily. Repeat dose in 3 days, Disp: 2 tablet, Rfl: 0    levonorgestrel (KYLEENA) 19.5 MG intrauterine device IUD, To be inserted one time by prescriber. Route intrauterine., Disp: , Rfl:      Past Medical History:   Diagnosis Date    Childhood asthma     Depression with anxiety         Past Surgical History:   Procedure Laterality Date    TONSILLECTOMY  2017    due to abscess    WISDOM TOOTH EXTRACTION         The additional following portions of the patient's history were reviewed and updated as appropriate: allergies and current medications.    Review of Systems   Constitutional: Negative.    Respiratory: Negative.     Gastrointestinal:  Negative.    Genitourinary:  Positive for pelvic pain (RLQ).   Psychiatric/Behavioral: Negative.         I have reviewed and agree with the HPI, ROS, and historical information as entered above. Nilamally NOWAK Trent, APRN      Objective   /76   Wt 112 kg (248 lb)   LMP 02/27/2025 (Approximate)   BMI 36.62 kg/m²     Physical Exam  Vitals and nursing note reviewed. Exam conducted with a chaperone present.   Constitutional:       Appearance: Normal appearance.   Genitourinary:     General: Normal vulva.      Exam position: Lithotomy position.      Labia:         Right: No rash, tenderness or lesion.         Left: No rash, tenderness or lesion.       Vagina: Normal. Bleeding present. No lesions.      Cervix: Normal. No cervical motion tenderness, discharge, lesion or cervical bleeding.      Uterus: Normal. Not enlarged, not fixed and not tender.       Adnexa: Right adnexa normal and left adnexa normal.        Right: No mass or tenderness.          Left: No mass or tenderness.        Rectum: Normal. No external hemorrhoid.      Comments: Chaperone Present  Neurological:      Mental Status: She is alert.         Assessment & Plan     Assessment     Problem List Items Addressed This Visit    None  Visit Diagnoses       Trichomonal vaginitis    -  Primary    Relevant Orders    Chlamydia trachomatis, Neisseria gonorrhoeae, Trichomonas vaginalis, PCR - Swab, Cervix    Pelvic pain        Relevant Orders    US Non-ob Transvaginal              Plan     Nuswab today. Will treat if indicated.  FU for gyn US to assess RLQ pelvic pain.  Condom use encouraged for STD prevention.  Return in about 1 week (around 3/10/2025) for Ultrasound.        FRANCISCO JAVIER Luis  03/03/2025

## 2025-03-05 ENCOUNTER — TELEMEDICINE (OUTPATIENT)
Dept: PSYCHIATRY | Facility: CLINIC | Age: 25
End: 2025-03-05
Payer: MEDICAID

## 2025-03-05 DIAGNOSIS — F43.10 POST TRAUMATIC STRESS DISORDER (PTSD): ICD-10-CM

## 2025-03-05 DIAGNOSIS — F33.1 MODERATE EPISODE OF RECURRENT MAJOR DEPRESSIVE DISORDER: ICD-10-CM

## 2025-03-05 DIAGNOSIS — F41.1 GAD (GENERALIZED ANXIETY DISORDER): Primary | ICD-10-CM

## 2025-03-05 LAB
C TRACH RRNA SPEC QL NAA+PROBE: NEGATIVE
N GONORRHOEA RRNA SPEC QL NAA+PROBE: NEGATIVE
T VAGINALIS RRNA SPEC QL NAA+PROBE: NEGATIVE

## 2025-03-05 NOTE — PROGRESS NOTES
Date: 2025  Time In: 10:08 EST  Time out: 10:52 AM EST    This provider is located at [facility], [facility] [place], [place], [facility], using a secure Lender Sentinel[employer] Video Visit through [employer]. Patient is being seen remotely via telehealth at their home address is located in [place]. Patient stated they are in a secure environment for this session. The patient's condition being diagnosed and treated is appropriate for telemedicine. The provider identified themself as well as their credentials. The patient, or  patient's legal guardian consent to be seen remotely, and when consent is given they understand that the consent allows for patient identifiable information to be sent to a third party as needed. They may refuse to be seen remotely at any time. The electronic data is encrypted and password protected, and the patient's or  legal guardian has been advised of the potential risks to privacy not withstanding such measures.   PT Identifiers used: Name and .    You have chosen to receive care through a telehealth visit.  Do you consent to use a video/audio connection for your medical care today? Yes    Subjective   Jenny Brunson is a 24 y.o. female who presents today for follow up    Chief Complaint:   Chief Complaint   Patient presents with    Anxiety    Depression    PTSD        Data: Pt arrived late to session, shorter session. Pt reported that she is all moved out of her apartment and into her moms house. Pt reported this adjustment has been difficult with  going from her own place with her kids to sharing a house with her kids, mom and brother. 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 Pt reported that she is feeling overwhelmed with her situation right now. Pt reported that her brother is not receptive to boundaries, feeling irritated.      Clinical Maneuvering/Intervention: Assisted patient in processing above session content; acknowledged and normalized  patient’s thoughts, feelings, and concerns.  Rationalized patient thought process regarding concerns presented at session.  Discussed triggers associated with patient's  anxiety , depression , and PTSD Also discussed coping skills for patient to implement such as grounding , mindfulness , increasing activity , self care , and positive self talk     Allowed patient to freely discuss issues without interruption or judgment. Provided safe, confidential environment to facilitate the development of positive therapeutic relationship and encourage open, honest communication. Assisted patient in identifying risk factors which would indicate the need for higher level of care including thoughts to harm self or others and/or self-harming behavior and encouraged patient to contact this office, call 911, or present to the nearest emergency room should any of these events occur. Discussed crisis intervention services and means to access. Patient adamantly and convincingly denies current suicidal or homicidal ideation or perceptual disturbance.    Assessment: Pt was alert and oriented x3.  Pt appears open and honest re MH symptoms that have affected life in a negative way. Pt appears motivated to improve MH symptoms and overall quality of life. Pt appears to be dealing with an increase in stress with her current situation. Pt appears to have some more help with the girls living with her mom. Pt appears to be struggling with the dynamic in the house with her brother. Pt appeared receptive to techniques and encouragements discussed in session.     Patient appears to maintain relative stability as compared to their baseline.  However, patient continues to struggle with   Chief Complaint   Patient presents with    Anxiety    Depression    PTSD    which continues to cause impairment in important areas of functioning.  A result, they can be reasonably expected to continue to benefit from treatment and would likely be at increased risk  for decompensation otherwise.      Mental Status Exam:   Hygiene:   good  Cooperation:  Cooperative  Eye Contact:  Fair  Psychomotor Behavior:  Appropriate  Affect:  Appropriate  Mood: normal  Speech:  Normal  Thought Process:  Linear  Thought Content:  Normal  Suicidal:  None  Homicidal:  None  Hallucinations:  None  Delusion:  None  Memory:  Intact  Orientation:  Grossly intact  Reliability:  fair  Insight:  Fair  Judgement:  Fair  Impulse Control:  Fair  Physical/Medical Issues:  No        Patient's Support Network Includes:  mother    Functional Status: No impairment    Progress toward goal: Not at goal    Prognosis: Fair with Ongoing Treatment     Plan:     Patient will continue in individual outpatient therapy with focus on improved functioning and coping skills, maintaining stability, and avoiding decompensation and the need for higher level of care.    Patient will adhere to medication regimen as prescribed and report any side effects. Patient will contact this office, call 911 or present to the nearest emergency room should suicidal or homicidal ideations occur. Provide Cognitive Behavioral Therapy and Solution Focused Therapy to improve functioning, maintain stability, and avoid decompensation and the need for higher level of care.     Return in about 1 week (around 3/12/2025).      VISIT DIAGNOSIS:    Diagnosis Plan   1. MECCA (generalized anxiety disorder)        2. Moderate episode of recurrent major depressive disorder        3. Post traumatic stress disorder (PTSD)         10:08 EST         This document has been electronically signed by Shanta Siegel LCSW  March 5, 2025      Part of this note may be an electronic transcription/translation of spoken language to printed text using the Dragon Dictation System.

## 2025-03-17 ENCOUNTER — TELEMEDICINE (OUTPATIENT)
Dept: PSYCHIATRY | Facility: CLINIC | Age: 25
End: 2025-03-17
Payer: MEDICAID

## 2025-03-17 DIAGNOSIS — F41.1 GAD (GENERALIZED ANXIETY DISORDER): Primary | ICD-10-CM

## 2025-03-17 DIAGNOSIS — F33.1 MODERATE EPISODE OF RECURRENT MAJOR DEPRESSIVE DISORDER: ICD-10-CM

## 2025-03-17 DIAGNOSIS — F43.10 POST TRAUMATIC STRESS DISORDER (PTSD): ICD-10-CM

## 2025-03-17 PROCEDURE — 90837 PSYTX W PT 60 MINUTES: CPT

## 2025-03-17 NOTE — PROGRESS NOTES
Date: 2025  Time In: 12:30 EDT  Time out: 1:24 PM EST    This provider is located at [facility], [facility] [place], [place], [facility], using a secure Once [employer]hart Video Visit through [employer]. Patient is being seen remotely via telehealth at their home address is located in [place]. Patient stated they are in a secure environment for this session. The patient's condition being diagnosed and treated is appropriate for telemedicine. The provider identified themself as well as their credentials. The patient, or  patient's legal guardian consent to be seen remotely, and when consent is given they understand that the consent allows for patient identifiable information to be sent to a third party as needed. They may refuse to be seen remotely at any time. The electronic data is encrypted and password protected, and the patient's or  legal guardian has been advised of the potential risks to privacy not withstanding such measures.   PT Identifiers used: Name and .    You have chosen to receive care through a telehealth visit.  Do you consent to use a video/audio connection for your medical care today? Yes    Subjective   Jenny Brunson is a 24 y.o. female who presents today for follow up    Chief Complaint:   Chief Complaint   Patient presents with    Anxiety    Depression    PTSD        Data: Pt missed last appointment. 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 Pt reported excitement but also anxiety. Pt reported that her first 8 weeks at school will be in the classroom with exams then the rest of the time out on the floor with hands on practice. Pt reported having test anxiety before and assumes she will now. Pt reported that she needs to get some appointments scheduled (dentist, PCP, eye doctor). Pt has worked out childcare with her mother. Clinician and pt discussed adjusting to new routine/schedule once school starts. Pt reported fearing her kids will have a hard  time adjusting to her being gone long hours.       Clinical Maneuvering/Intervention: Assisted patient in processing above session content; acknowledged and normalized patient’s thoughts, feelings, and concerns.  Rationalized patient thought process regarding concerns presented at session.  Discussed triggers associated with patient's  anxiety , depression , and PTSD Also discussed coping skills for patient to implement such as grounding , mindfulness , increasing activity , self care , and positive self talk     Allowed patient to freely discuss issues without interruption or judgment. Provided safe, confidential environment to facilitate the development of positive therapeutic relationship and encourage open, honest communication. Assisted patient in identifying risk factors which would indicate the need for higher level of care including thoughts to harm self or others and/or self-harming behavior and encouraged patient to contact this office, call 911, or present to the nearest emergency room should any of these events occur. Discussed crisis intervention services and means to access. Patient adamantly and convincingly denies current suicidal or homicidal ideation or perceptual disturbance.    Assessment:  Pt was alert and oriented x3.  Pt appears open and honest re MH symptoms that have affected life in a negative way. Pt appears motivated to improve MH symptoms and overall quality of life. Pt appears to have goals for the future and working towards them. Pt appears to have test anxiety from struggling with focus in the past, willing to discuss this with PCP. Pt appears to have some concerns with changes. Pt appears to still be adjusting to living with her mom and brothers again. Pt appears to have unalterable circumstances that effect MH. Pt appeared receptive to techniques and encouragements discussed in session.     Patient appears to maintain relative stability as compared to their baseline.  However,  patient continues to struggle with   Chief Complaint   Patient presents with    Anxiety    Depression    PTSD    which continues to cause impairment in important areas of functioning.  A result, they can be reasonably expected to continue to benefit from treatment and would likely be at increased risk for decompensation otherwise.      Mental Status Exam:   Hygiene:   good  Cooperation:  Cooperative  Eye Contact:  Fair  Psychomotor Behavior:  Appropriate  Affect:  Appropriate  Mood: depressed  Speech:  Normal  Thought Process:  Linear  Thought Content:  Mood congruent  Suicidal:  None  Homicidal:  None  Hallucinations:  None  Delusion:  None  Memory:  Intact  Orientation:  Grossly intact  Reliability:  fair  Insight:  Fair  Judgement:  Fair  Impulse Control:  Fair  Physical/Medical Issues:  No        Patient's Support Network Includes:  mother    Functional Status: No impairment    Progress toward goal: Not at goal    Prognosis: Fair with Ongoing Treatment     Plan:     Patient will continue in individual outpatient therapy with focus on improved functioning and coping skills, maintaining stability, and avoiding decompensation and the need for higher level of care.    Patient will adhere to medication regimen as prescribed and report any side effects. Patient will contact this office, call 911 or present to the nearest emergency room should suicidal or homicidal ideations occur. Provide Cognitive Behavioral Therapy and Solution Focused Therapy to improve functioning, maintain stability, and avoid decompensation and the need for higher level of care.     Return in about 2 weeks (around 3/31/2025).      VISIT DIAGNOSIS:    Diagnosis Plan   1. MECCA (generalized anxiety disorder)        2. Moderate episode of recurrent major depressive disorder        3. Post traumatic stress disorder (PTSD)         12:30 EDT         This document has been electronically signed by Shanta Siegel LCSW  March 17, 2025      Part of  this note may be an electronic transcription/translation of spoken language to printed text using the Dragon Dictation System.

## 2025-04-02 ENCOUNTER — TELEMEDICINE (OUTPATIENT)
Dept: PSYCHIATRY | Facility: CLINIC | Age: 25
End: 2025-04-02
Payer: MEDICAID

## 2025-04-02 DIAGNOSIS — F43.10 POST TRAUMATIC STRESS DISORDER (PTSD): ICD-10-CM

## 2025-04-02 DIAGNOSIS — F41.1 GAD (GENERALIZED ANXIETY DISORDER): Primary | ICD-10-CM

## 2025-04-02 DIAGNOSIS — F33.1 MODERATE EPISODE OF RECURRENT MAJOR DEPRESSIVE DISORDER: ICD-10-CM

## 2025-04-02 NOTE — PROGRESS NOTES
Date: 2025  Time In: 13:00 EDT  Time out: 1:57 PM EST    This provider is located at [facility] [place] [facility], [facility] [place], [place], [facility], using a secure CrossReaderhart Video Visit through [employer]. Patient is being seen remotely via telehealth at their home address is located in [place]. Patient stated they are in a secure environment for this session. The patient's condition being diagnosed and treated is appropriate for telemedicine. The provider identified themself as well as their credentials. The patient, or  patient's legal guardian consent to be seen remotely, and when consent is given they understand that the consent allows for patient identifiable information to be sent to a third party as needed. They may refuse to be seen remotely at any time. The electronic data is encrypted and password protected, and the patient's or  legal guardian has been advised of the potential risks to privacy not withstanding such measures.   PT Identifiers used: Name and .    You have chosen to receive care through a telehealth visit.  Do you consent to use a video/audio connection for your medical care today? Yes    Subjective   Jenny Brunson is a 24 y.o. female who presents today for follow up    Chief Complaint:   Chief Complaint   Patient presents with    Anxiety    Depression    PTSD        Data: Pt reported having a good vacation with her kids. Pt reported that there has been an adjustment getting her girls back into a routine. Pt reported she missed her OBGYN appointment do to being out of town. 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 Pt reported that she went out with friends yesterday, enjoyed the social aspect. Pt reported that one of the friends she will try to keep some distance from. Pt reported feeling 'stuck in the past' re friend, grandma, dad who passed away and FOC. Pt reported that she continues to attend her mom group in .Pt and clinician  discussed relationships and trust issues that prevent her from building relationships.       Clinical Maneuvering/Intervention: Assisted patient in processing above session content; acknowledged and normalized patient’s thoughts, feelings, and concerns.  Rationalized patient thought process regarding concerns presented at session.  Discussed triggers associated with patient's  anxiety , depression , and PTSD Also discussed coping skills for patient to implement such as grounding , mindfulness , increasing activity , self care , and positive self talk     Allowed patient to freely discuss issues without interruption or judgment. Provided safe, confidential environment to facilitate the development of positive therapeutic relationship and encourage open, honest communication. Assisted patient in identifying risk factors which would indicate the need for higher level of care including thoughts to harm self or others and/or self-harming behavior and encouraged patient to contact this office, call 911, or present to the nearest emergency room should any of these events occur. Discussed crisis intervention services and means to access. Patient adamantly and convincingly denies current suicidal or homicidal ideation or perceptual disturbance.    Assessment: Pt was alert and oriented x3.  Pt appears open and honest re MH symptoms that have affected life in a negative way. Pt appears motivated to improve MH symptoms and overall quality of life. Pt appears to have some issues with trust due to PTSD, interferes with pt forming meaningful relationships. Pt appears to have an action plan to get things done before school starts at the end of the month. Pt appears to have anxiety with school starting soon, overall manageable. Pt has reminders of  family member approaching that may be contributing to increased thoughts re grief. Pt appears to have goals for the future they are working towards. Pt appeared receptive to  techniques and encouragements discussed in session.     Patient appears to maintain relative stability as compared to their baseline.  However, patient continues to struggle with   Chief Complaint   Patient presents with    Anxiety    Depression    PTSD    which continues to cause impairment in important areas of functioning.  A result, they can be reasonably expected to continue to benefit from treatment and would likely be at increased risk for decompensation otherwise.      Mental Status Exam:   Hygiene:   good  Cooperation:  Cooperative  Eye Contact:  Good  Psychomotor Behavior:  Appropriate  Affect:  Appropriate  Mood: normal  Speech:  Normal  Thought Process:  Linear  Thought Content:  Normal  Suicidal:  None  Homicidal:  None  Hallucinations:  None  Delusion:  None  Memory:  Intact  Orientation:  Grossly intact  Reliability:  fair  Insight:  Fair  Judgement:  Fair  Impulse Control:  Fair  Physical/Medical Issues:  No        Patient's Support Network Includes:   mother, limited healthy support    Functional Status: No impairment    Progress toward goal: Not at goal    Prognosis: Fair with Ongoing Treatment     Plan:     Patient will continue in individual outpatient therapy with focus on improved functioning and coping skills, maintaining stability, and avoiding decompensation and the need for higher level of care.    Patient will adhere to medication regimen as prescribed and report any side effects. Patient will contact this office, call 911 or present to the nearest emergency room should suicidal or homicidal ideations occur. Provide Cognitive Behavioral Therapy and Solution Focused Therapy to improve functioning, maintain stability, and avoid decompensation and the need for higher level of care.     Return in about 2 weeks (around 4/16/2025).      VISIT DIAGNOSIS:    Diagnosis Plan   1. MECCA (generalized anxiety disorder)        2. Moderate episode of recurrent major depressive disorder        3. Post  traumatic stress disorder (PTSD)         13:00 EDT         This document has been electronically signed by Shanta Siegel LCSW  April 2, 2025      Part of this note may be an electronic transcription/translation of spoken language to printed text using the Dragon Dictation System.

## 2025-05-12 ENCOUNTER — TELEMEDICINE (OUTPATIENT)
Dept: PSYCHIATRY | Facility: CLINIC | Age: 25
End: 2025-05-12
Payer: MEDICAID

## 2025-05-12 DIAGNOSIS — F43.10 POST TRAUMATIC STRESS DISORDER (PTSD): ICD-10-CM

## 2025-05-12 DIAGNOSIS — F33.1 MODERATE EPISODE OF RECURRENT MAJOR DEPRESSIVE DISORDER: ICD-10-CM

## 2025-05-12 DIAGNOSIS — F41.1 GAD (GENERALIZED ANXIETY DISORDER): Primary | ICD-10-CM

## 2025-05-12 PROCEDURE — 90832 PSYTX W PT 30 MINUTES: CPT

## 2025-05-12 NOTE — PROGRESS NOTES
Date: May 12, 2025  Time In: 16:57 EDT  Time out: 5:19 PM EST    This provider is located at [facility], [facility] [place], [place], [facility], using a secure Brandpotionhart Video Visit through [employer]. Patient is being seen remotely via telehealth at their home address is located in [place]. Patient stated they are in a secure environment for this session. The patient's condition being diagnosed and treated is appropriate for telemedicine. The provider identified themself as well as their credentials. The patient, or  patient's legal guardian consent to be seen remotely, and when consent is given they understand that the consent allows for patient identifiable information to be sent to a third party as needed. They may refuse to be seen remotely at any time. The electronic data is encrypted and password protected, and the patient's or  legal guardian has been advised of the potential risks to privacy not withstanding such measures.   PT Identifiers used: Name and .    You have chosen to receive care through a telehealth visit.  Do you consent to use a video/audio connection for your medical care today? Yes    Subjective   Jenny Brunson is a 24 y.o. female who presents today for follow up    Chief Complaint:   Chief Complaint   Patient presents with    Anxiety    Depression    PTSD      Short session: Session time was exclusive to therapy and separate from time spent outside of session to aid with MH symptoms.     Data: Pt reported she is doing good. Pt reported that she is still attending her mom group on , going well. Pt reported that she started school and this is also going well. Pt reported talking to the [facility], expecting him to help out more with children's needs. Pt reported that she has not been reacting to things he says like she used to. Pt reported that her schedule is thrown off, waking up at 5am -using this time for herself. Pt reported that she has tried to  continuing working out.       Clinical Maneuvering/Intervention:  Assisted patient in processing above session content; acknowledged and normalized patient’s thoughts, feelings, and concerns.  Rationalized patient thought process regarding concerns presented at session.  Discussed triggers associated with patient's  anxiety , depression , and PTSD Also discussed coping skills for patient to implement such as grounding , mindfulness , increasing activity , self care , and positive self talk     Allowed patient to freely discuss issues without interruption or judgment. Provided safe, confidential environment to facilitate the development of positive therapeutic relationship and encourage open, honest communication. Assisted patient in identifying risk factors which would indicate the need for higher level of care including thoughts to harm self or others and/or self-harming behavior and encouraged patient to contact this office, call 911, or present to the nearest emergency room should any of these events occur. Discussed crisis intervention services and means to access. Patient adamantly and convincingly denies current suicidal or homicidal ideation or perceptual disturbance.    Assessment: Pt was alert and oriented x3.  Pt appears open and honest re MH symptoms that have affected life in a negative way. Pt appears motivated to improve MH symptoms and overall quality of life.Pt appears in good spirits today. Pt appears to to be increased stress with finances and time management. Overall, pt appears to have manageable MH at this time. Pt appears to be focused on school and goals for the future. Pt appeared receptive to techniques and encouragements discussed in session.     Patient appears to maintain relative stability as compared to their baseline.  However, patient continues to struggle with   Chief Complaint   Patient presents with    Anxiety    Depression    PTSD    which continues to cause impairment in  important areas of functioning.  A result, they can be reasonably expected to continue to benefit from treatment and would likely be at increased risk for decompensation otherwise.      Mental Status Exam:   Hygiene:   good  Cooperation:  Cooperative  Eye Contact:  Good  Psychomotor Behavior:  Appropriate  Affect:  Appropriate  Mood: normal  Speech:  Normal  Thought Process:  Linear  Thought Content:  Normal  Suicidal:  None  Homicidal:  None  Hallucinations:  None  Delusion:  None  Memory:  Intact  Orientation:  Person, Place, and Time  Reliability:  fair  Insight:  Fair  Judgement:  Fair  Impulse Control:  Fair  Physical/Medical Issues:  No        Patient's Support Network Includes:   limited     Functional Status: No impairment    Progress toward goal: Not at goal    Prognosis: Fair with Ongoing Treatment     Plan: update tx plan    Patient will continue in individual outpatient therapy with focus on improved functioning and coping skills, maintaining stability, and avoiding decompensation and the need for higher level of care.    Patient will adhere to medication regimen as prescribed and report any side effects. Patient will contact this office, call 911 or present to the nearest emergency room should suicidal or homicidal ideations occur. Provide Cognitive Behavioral Therapy and Solution Focused Therapy to improve functioning, maintain stability, and avoid decompensation and the need for higher level of care.     Return in about 8 weeks (around 7/7/2025).      VISIT DIAGNOSIS:    Diagnosis Plan   1. MECCA (generalized anxiety disorder)        2. Moderate episode of recurrent major depressive disorder        3. Post traumatic stress disorder (PTSD)         16:57 EDT         This document has been electronically signed by Shanta Siegel LCSW  May 12, 2025      Part of this note may be an electronic transcription/translation of spoken language to printed text using the Dragon Dictation System.

## 2025-05-16 ENCOUNTER — TELEPHONE (OUTPATIENT)
Dept: OBSTETRICS AND GYNECOLOGY | Facility: CLINIC | Age: 25
End: 2025-05-16
Payer: MEDICAID

## 2025-05-16 NOTE — TELEPHONE ENCOUNTER
Pt called in states has an IUD, she took a home pregnancy test, showing positive. She has questions and concerns

## 2025-05-16 NOTE — TELEPHONE ENCOUNTER
Called pt. Tara was inserted 11/5/24. Pt reports a normal light period that started 5/5/25 lasting 6-7 days, but has continued to spot since then. Which is abnormal for her. States she has also had no appetite, abdominal cramping and a HA for 2 weeks, so she took a UPT 2 days ago. States it quickly became positive. She only did one UPT and has not taken another test yet. Advised pt to do another UPT and call us with the results. Pt states she is at school but plans to test afterward this afternoon.

## 2025-05-16 NOTE — TELEPHONE ENCOUNTER
Per Jaymie Joya: Pt should come in for HCG today. If positive will need to remove IUD.  Called pt with Vanessa recommendation. Pt states she cannot come in today as she is in class and cannot leave. Advised pt to do a second upt when she gets home. If positive she should call Monday morning to come in for HCG. Instructed pt to go to the ER if she has any heavy bleeding or severe pain over the weekend. Pt GEORGI.

## 2025-06-20 ENCOUNTER — TELEPHONE (OUTPATIENT)
Dept: OBSTETRICS AND GYNECOLOGY | Facility: CLINIC | Age: 25
End: 2025-06-20
Payer: MEDICAID

## 2025-06-20 ENCOUNTER — OFFICE VISIT (OUTPATIENT)
Dept: OBSTETRICS AND GYNECOLOGY | Facility: CLINIC | Age: 25
End: 2025-06-20
Payer: MEDICAID

## 2025-06-20 VITALS
HEIGHT: 69 IN | DIASTOLIC BLOOD PRESSURE: 70 MMHG | BODY MASS INDEX: 36.29 KG/M2 | WEIGHT: 245 LBS | SYSTOLIC BLOOD PRESSURE: 124 MMHG

## 2025-06-20 DIAGNOSIS — N63.0 MASS OF BREAST, UNSPECIFIED LATERALITY: Primary | ICD-10-CM

## 2025-06-20 RX ORDER — IBUPROFEN 800 MG/1
800 TABLET, FILM COATED ORAL EVERY 8 HOURS PRN
COMMUNITY
Start: 2025-06-20

## 2025-06-20 RX ORDER — METHOCARBAMOL 500 MG/1
500 TABLET, FILM COATED ORAL
COMMUNITY
Start: 2025-06-20

## 2025-06-20 NOTE — PROGRESS NOTES
OBGYN Office Note      Chief Complaint   Patient presents with    Breast Problem        Subjective     HPI  Jenny Brunson is a 24 y.o. female, , who presents with breast complaints of lump in her (R) breast. She went to ED at [facility] this morning w (R) sided chest pain, shoulder and back pain. She was told no cardiac issues, but she has a lump in her (R) breast and to f/u with GYN.  This is present in upper inner quadrant of right breast(s).    She states she has experienced this problem for 2 days. The problem has worsened  since it began. The patient denies changed mole, dryness, nipple discharge, pruritus, rash, skin color change, and skin lesion(s). She has not had a mammogram before. She does have a family history of breast cancer in her PGGM. They were diagnosed at age -pt does not know at what age.. Other concerns include: fatigue    Was previously drinking 2 cans of soda daily. Recently decreased to 1 can daily with more water intake within the last 2 weeks. Denies other sources of caffeine intake.     No LMP recorded (lmp unknown). Patient has had an implant..      Current contraception: contraceptive methods: IUD.  Insertion date: Kyleena 2024    Last mammogram:   Last Completed Mammogram    This patient has no relevant Health Maintenance data.             Past Medical History:   Diagnosis Date    Childhood asthma 2007    Depression with anxiety        Past Surgical History:   Procedure Laterality Date    TONSILLECTOMY  2017    due to abscess    WISDOM TOOTH EXTRACTION         Family History   Problem Relation Age of Onset    Skin cancer Mother     Autism Brother     Bipolar disorder Brother     Psychosis Brother     Breast cancer Paternal Great-Grandmother     Ovarian cancer Neg Hx     Uterine cancer Neg Hx     Colon cancer Neg Hx           Current Outpatient Medications:     ibuprofen (ADVIL,MOTRIN) 800 MG tablet, Take 1 tablet by mouth Every 8 (Eight) Hours As Needed for Mild  "Pain or Moderate Pain., Disp: , Rfl:     levonorgestrel (KYLEENA) 19.5 MG intrauterine device IUD, To be inserted one time by prescriber. Route intrauterine., Disp: , Rfl:     methocarbamol (ROBAXIN) 500 MG tablet, Take 1 tablet by mouth., Disp: , Rfl:     fluconazole (Diflucan) 150 MG tablet, Take 1 tablet by mouth Daily. Repeat dose in 3 days, Disp: 2 tablet, Rfl: 0     Review of Systems   Respiratory:  Positive for shortness of breath.         Evaluated @ ED today.   Cardiovascular:  Positive for chest pain.        Evaluated @ ED today.   Genitourinary: Negative.  Positive for breast lump and breast pain. Negative for breast discharge.   Psychiatric/Behavioral:  Positive for depressed mood. Negative for suicidal ideas. The patient is nervous/anxious.         Therapy manages per pt.        I have reviewed and agree with the HPI, ROS, and historical information as entered above. Joanna Damon, APRN      Objective   /70   Ht 175.3 cm (69\")   Wt 111 kg (245 lb)   LMP  (LMP Unknown)   BMI 36.18 kg/m²     Physical Exam  Nursing note reviewed. Exam conducted with a chaperone present.   Constitutional:       General: She is not in acute distress.     Appearance: Normal appearance. She is not ill-appearing or toxic-appearing.   HENT:      Head: Normocephalic and atraumatic.   Pulmonary:      Effort: Pulmonary effort is normal.   Chest:      Chest wall: No deformity or tenderness.   Breasts:     Breasts are symmetrical.      Right: Mass present. No swelling, bleeding, inverted nipple, nipple discharge, skin change or tenderness.      Left: No swelling, bleeding, inverted nipple, mass, nipple discharge, skin change or tenderness.       Lymphadenopathy:      Upper Body:      Right upper body: No supraclavicular, axillary or pectoral adenopathy.      Left upper body: No supraclavicular, axillary or pectoral adenopathy.   Neurological:      Mental Status: She is alert and oriented to person, place, and time. "   Psychiatric:         Mood and Affect: Mood normal.           Assessment & Plan     Assessment     Problem List Items Addressed This Visit    None  Visit Diagnoses         Mass of breast, unspecified laterality    -  Primary    Relevant Orders    US Breast Bilateral Complete              Plan    Discussed fibrocystic disease and alleviating measures.  Reassured the patient that the finding is most likely benign.  Bilateral breast US ordered.   CP and SOB- evaluated at ED today. Instrcuted to return for further evaluation if symptoms persist or worsen.   Breast tenderness and fibrocystic change education included in patient instructions.   Return in about 5 months (around 11/11/2025) for Annual physical or sooner if needed.      Joanna Damon, APRN  06/20/2025

## 2025-06-20 NOTE — TELEPHONE ENCOUNTER
Patient states she is currently in the [facility] ER for chest pain. Patient reports it is right sided and radiates to her back. While there they did a breast check and found a hard painful lump in the right breast. She was told to follow up with our office and to schedule a mammogram. Appointment scheduled.

## 2025-06-20 NOTE — TELEPHONE ENCOUNTER
Caller: Jenny Brunson    Relationship to patient: Self    Best call back number: [phone] (home)     Patient is needing: PT HAS LUMP IN RIGHT BREAST. IT IS TENDER. WOULD LIKE APPT TODAY IF POSSIBLE.

## 2025-07-03 ENCOUNTER — HOSPITAL ENCOUNTER (OUTPATIENT)
Dept: ULTRASOUND IMAGING | Facility: HOSPITAL | Age: 25
Discharge: HOME | End: 2025-07-03
Admitting: RADIOLOGY
Payer: MEDICAID

## 2025-07-03 DIAGNOSIS — N63.0 MASS OF BREAST, UNSPECIFIED LATERALITY: ICD-10-CM

## 2025-07-03 PROCEDURE — 76642 ULTRASOUND BREAST LIMITED: CPT

## 2025-07-16 ENCOUNTER — TELEMEDICINE (OUTPATIENT)
Dept: PSYCHIATRY | Facility: CLINIC | Age: 25
End: 2025-07-16
Payer: MEDICAID

## 2025-07-16 DIAGNOSIS — F41.1 GAD (GENERALIZED ANXIETY DISORDER): Primary | ICD-10-CM

## 2025-07-16 DIAGNOSIS — F43.10 POST TRAUMATIC STRESS DISORDER (PTSD): ICD-10-CM

## 2025-07-16 DIAGNOSIS — F33.1 MODERATE EPISODE OF RECURRENT MAJOR DEPRESSIVE DISORDER: ICD-10-CM

## 2025-07-16 NOTE — PROGRESS NOTES
Date: 2025  Time In: 08:21 EDT  Time out: 8:58 AM EST    This provider is located at [facility], [facility] [place], [place], [facility], using a secure [employer] Video Visit through [employer]. Patient is being seen remotely via telehealth at their school address is located in [place]. Patient stated they are in a secure environment for this session. The patient's condition being diagnosed and treated is appropriate for telemedicine. The provider identified themself as well as their credentials. The patient, or  patient's legal guardian consent to be seen remotely, and when consent is given they understand that the consent allows for patient identifiable information to be sent to a third party as needed. They may refuse to be seen remotely at any time. The electronic data is encrypted and password protected, and the patient's or  legal guardian has been advised of the potential risks to privacy not withstanding such measures.   PT Identifiers used: Name and .    You have chosen to receive care through a telehealth visit.  Do you consent to use a video/audio connection for your medical care today? Yes    Subjective   Jenny Brunson is a 25 y.o. female who presents today for follow up    Chief Complaint:   Chief Complaint   Patient presents with    Anxiety    Depression    PTSD      Short session: Session time was exclusive to therapy and separate from time spent outside of session to aid with MH symptoms.     Data: Session started late as pt was driving to school upon arrival. Pt reflected on stress at school and some issues with peers. Pt reported that she doesn't feel supported from her instructor. Pt reported that she is trying to avoid drama at school. Pt reported having more financial aid from [facility]. Pt reflected on seeing and interaction with FOC whom she has not seen in months. Pt reflected on emotions re this. Pt reported that finances have been stressful for her with not  working and in school FT. Pt reflected on impulsive spending at times.      Clinical Maneuvering/Intervention:  Assisted patient in processing above session content; acknowledged and normalized patient’s thoughts, feelings, and concerns.  Rationalized patient thought process regarding concerns presented at session.  Discussed triggers associated with patient's  anxiety , depression , and PTSD Also discussed coping skills for patient to implement such as grounding , mindfulness , increasing activity , self care , and positive self talk     Allowed patient to freely discuss issues without interruption or judgment. Provided safe, confidential environment to facilitate the development of positive therapeutic relationship and encourage open, honest communication. Assisted patient in identifying risk factors which would indicate the need for higher level of care including thoughts to harm self or others and/or self-harming behavior and encouraged patient to contact this office, call 911, or present to the nearest emergency room should any of these events occur. Discussed crisis intervention services and means to access. Patient adamantly and convincingly denies current suicidal or homicidal ideation or perceptual disturbance.    Assessment:  Pt was alert and oriented x3. Pt appears open and honest re MH symptoms that have affected life in a negative way. Pt appears motivated to improve MH symptoms and overall quality of life. Pt appears to have increased stress with school/life balance. Pt has some help with children through family, little physical support from FOC. Pt appears to have poor long term thinking skills re finances. Pt appears motivated to continue her path in school and have more stability in the future. Pt appears to have some anxiety with stressors that are out of her control. Pt appeared receptive to techniques and encouragements discussed in session.     Patient appears to maintain relative stability as  compared to their baseline.  However, patient continues to struggle with   Chief Complaint   Patient presents with    Anxiety    Depression    PTSD    which continues to cause impairment in important areas of functioning.  A result, they can be reasonably expected to continue to benefit from treatment and would likely be at increased risk for decompensation otherwise.      Mental Status Exam:   Hygiene:   good  Cooperation:  Cooperative  Eye Contact:  Fair  Psychomotor Behavior:  Appropriate  Affect:  Appropriate  Mood: stressed, anxious  Speech:  Normal  Thought Process:  Linear  Thought Content:  Mood congruent  Suicidal:  None  Homicidal:  None  Hallucinations:  None  Delusion:  None  Memory:  Intact  Orientation:  Grossly intact  Reliability:  fair  Insight:  Fair  Judgement:  Fair  Impulse Control:  Fair  Physical/Medical Issues:  No        Patient's Support Network Includes:  limited healthy support    Functional Status: No impairment    Progress toward goal: Not at goal    Prognosis: Fair with Ongoing Treatment         Plan: update tx plan during next session with full time    Patient will continue in individual outpatient therapy with focus on improved functioning and coping skills, maintaining stability, and avoiding decompensation and the need for higher level of care.    Patient will adhere to medication regimen as prescribed and report any side effects. Patient will contact this office, call 911 or present to the nearest emergency room should suicidal or homicidal ideations occur. Provide Cognitive Behavioral Therapy and Solution Focused Therapy to improve functioning, maintain stability, and avoid decompensation and the need for higher level of care.     Return in about 2 weeks (around 7/30/2025).      VISIT DIAGNOSIS:    Diagnosis Plan   1. MECCA (generalized anxiety disorder)        2. Moderate episode of recurrent major depressive disorder        3. Post traumatic stress disorder (PTSD)         08:11  EDT         This document has been electronically signed by Shanta Siegel LCSW  July 16, 2025      Part of this note may be an electronic transcription/translation of spoken language to printed text using the Dragon Dictation System.

## 2025-07-30 ENCOUNTER — TELEPHONE (OUTPATIENT)
Dept: PSYCHIATRY | Facility: CLINIC | Age: 25
End: 2025-07-30

## 2025-07-30 NOTE — TELEPHONE ENCOUNTER
Patient has four no showed appointments for the year. Please advise if you are in agreeable for patient to schedule back or if you recommend in person services due to non-compliance of the no show policy.       Please advise

## 2025-07-31 NOTE — TELEPHONE ENCOUNTER
I have spoke with the patient and discussed the no show policy. Patient was made aware that therapist has recommended her for in person services due to non-compliance of the no show policy. Patient stated she was told if she could to try and cancel the appointment ahead of time, but that if she couldn't do so then that was fine. Patient verbalized her understanding that future appointment with Shanta Siegel has been cancelled.